# Patient Record
Sex: FEMALE | Race: WHITE | Employment: FULL TIME | URBAN - METROPOLITAN AREA
[De-identification: names, ages, dates, MRNs, and addresses within clinical notes are randomized per-mention and may not be internally consistent; named-entity substitution may affect disease eponyms.]

---

## 2017-03-23 ENCOUNTER — OP HISTORICAL/CONVERTED ENCOUNTER (OUTPATIENT)
Dept: OTHER | Age: 57
End: 2017-03-23

## 2017-04-03 ENCOUNTER — OP HISTORICAL/CONVERTED ENCOUNTER (OUTPATIENT)
Dept: OTHER | Age: 57
End: 2017-04-03

## 2017-04-12 ENCOUNTER — OP HISTORICAL/CONVERTED ENCOUNTER (OUTPATIENT)
Dept: OTHER | Age: 57
End: 2017-04-12

## 2018-01-11 ENCOUNTER — OP HISTORICAL/CONVERTED ENCOUNTER (OUTPATIENT)
Dept: OTHER | Age: 58
End: 2018-01-11

## 2018-04-02 ENCOUNTER — OP HISTORICAL/CONVERTED ENCOUNTER (OUTPATIENT)
Dept: OTHER | Age: 58
End: 2018-04-02

## 2018-06-24 ENCOUNTER — ED HISTORICAL/CONVERTED ENCOUNTER (OUTPATIENT)
Dept: OTHER | Age: 58
End: 2018-06-24

## 2019-03-26 ENCOUNTER — OP HISTORICAL/CONVERTED ENCOUNTER (OUTPATIENT)
Dept: OTHER | Age: 59
End: 2019-03-26

## 2019-03-29 ENCOUNTER — OP HISTORICAL/CONVERTED ENCOUNTER (OUTPATIENT)
Dept: OTHER | Age: 59
End: 2019-03-29

## 2019-12-26 ENCOUNTER — OP HISTORICAL/CONVERTED ENCOUNTER (OUTPATIENT)
Dept: OTHER | Age: 59
End: 2019-12-26

## 2020-04-18 ENCOUNTER — ED HISTORICAL/CONVERTED ENCOUNTER (OUTPATIENT)
Dept: OTHER | Age: 60
End: 2020-04-18

## 2020-08-03 RX ORDER — LEVOTHYROXINE SODIUM 150 UG/1
TABLET ORAL
Qty: 90 TAB | Refills: 1 | Status: SHIPPED | OUTPATIENT
Start: 2020-08-03 | End: 2021-03-30

## 2020-10-30 DIAGNOSIS — J41.1 MUCOPURULENT CHRONIC BRONCHITIS (HCC): Primary | ICD-10-CM

## 2020-10-30 RX ORDER — AZITHROMYCIN 250 MG/1
TABLET, FILM COATED ORAL
Qty: 6 TAB | Refills: 0 | Status: SHIPPED | OUTPATIENT
Start: 2020-10-30 | End: 2020-11-04

## 2020-10-30 RX ORDER — CODEINE PHOSPHATE AND GUAIFENESIN 10; 100 MG/5ML; MG/5ML
5 SOLUTION ORAL
Qty: 1 BOTTLE | Refills: 0 | Status: SHIPPED | OUTPATIENT
Start: 2020-10-30 | End: 2020-11-06 | Stop reason: ALTCHOICE

## 2020-11-02 ENCOUNTER — HOSPITAL ENCOUNTER (EMERGENCY)
Age: 60
Discharge: HOME | End: 2020-11-02
Attending: EMERGENCY MEDICINE
Payer: COMMERCIAL

## 2020-11-02 ENCOUNTER — APPOINTMENT (OUTPATIENT)
Dept: GENERAL RADIOLOGY | Age: 60
End: 2020-11-02
Attending: EMERGENCY MEDICINE
Payer: COMMERCIAL

## 2020-11-02 VITALS
BODY MASS INDEX: 37.49 KG/M2 | TEMPERATURE: 98.5 F | HEIGHT: 65 IN | RESPIRATION RATE: 18 BRPM | SYSTOLIC BLOOD PRESSURE: 139 MMHG | WEIGHT: 225 LBS | HEART RATE: 83 BPM | OXYGEN SATURATION: 96 % | DIASTOLIC BLOOD PRESSURE: 95 MMHG

## 2020-11-02 DIAGNOSIS — B97.89 VIRAL RESPIRATORY ILLNESS: Primary | ICD-10-CM

## 2020-11-02 DIAGNOSIS — J98.8 VIRAL RESPIRATORY ILLNESS: Primary | ICD-10-CM

## 2020-11-02 LAB
FLUAV AG NPH QL IA: NEGATIVE
FLUBV AG NOSE QL IA: NEGATIVE

## 2020-11-02 PROCEDURE — 87635 SARS-COV-2 COVID-19 AMP PRB: CPT

## 2020-11-02 PROCEDURE — 71045 X-RAY EXAM CHEST 1 VIEW: CPT

## 2020-11-02 PROCEDURE — 87804 INFLUENZA ASSAY W/OPTIC: CPT

## 2020-11-02 PROCEDURE — 99282 EMERGENCY DEPT VISIT SF MDM: CPT

## 2020-11-02 RX ORDER — PREDNISONE 50 MG/1
50 TABLET ORAL DAILY
Qty: 5 TAB | Refills: 0 | Status: SHIPPED | OUTPATIENT
Start: 2020-11-02 | End: 2020-11-07

## 2020-11-02 RX ORDER — BENZONATATE 100 MG/1
100 CAPSULE ORAL
Qty: 16 CAP | Refills: 0 | Status: SHIPPED | OUTPATIENT
Start: 2020-11-02 | End: 2020-11-06 | Stop reason: SDUPTHER

## 2020-11-02 NOTE — LETTER
[address] [address] [place] [address] 
[phone] Work/School Note Date: 11/2/2020 To Whom It May concern: 
 
Otto Abarca was evaulated by the following provider(s): 
Attending Provider: Sahara Mccall [address] [place] [address] virus is suspected. Per the CDC guidelines we recommend home isolation until the following conditions are all met: 1. At least 10 days have passed since symptoms first appeared and 2. At least 24 hours have passed since last fever without the use of fever-reducing medications and 
3. Symptoms (e.g., cough, shortness of breath) have improved Sincerely, 
Dr. Melo Falling

## 2020-11-02 NOTE — ED PROVIDER NOTES
HPI   Chief Complaint   Patient presents with    Shortness of Breath    Cough   78-year-old female with chronic asthma presents with shortness of breath and cough. Patient reports her symptoms started 10/28/2020. On the first day had fever 100.4 but no fever since then. Bilateral ear pain, sore throat initially, in the last several days started to have a non-productive cough, chest congestion and SOB. No chest pain or leg swelling. On Nyquil, Dayquil, azithromycin, breathing treatment. Not receiving sufficient relief. Works in a hospital.  Got flu shot a couple of weeks ago. Quit smoking 6 years ago. Past Medical History:   Diagnosis Date    Chronic asthma        History reviewed. No pertinent surgical history. History reviewed. No pertinent family history.     Social History     Socioeconomic History    Marital status:      Spouse name: Not on file    Number of children: Not on file    Years of education: Not on file    Highest education level: Not on file   Occupational History    Not on file   Social Needs    Financial resource strain: Not on file    Food insecurity     Worry: Not on file     Inability: Not on file    Transportation needs     Medical: Not on file     Non-medical: Not on file   Tobacco Use    Smoking status: Not on file   Substance and Sexual Activity    Alcohol use: Not on file    Drug use: Not on file    Sexual activity: Not on file   Lifestyle    Physical activity     Days per week: Not on file     Minutes per session: Not on file    Stress: Not on file   Relationships    Social connections     Talks on phone: Not on file     Gets together: Not on file     Attends Religion service: Not on file     Active member of club or organization: Not on file     Attends meetings of clubs or organizations: Not on file     Relationship status: Not on file    Intimate partner violence     Fear of current or ex partner: Not on file     Emotionally abused: Not on file Physically abused: Not on file     Forced sexual activity: Not on file   Other Topics Concern    Not on file   Social History Narrative    Not on file         ALLERGIES: Patient has no known allergies. Review of Systems   Constitutional: Positive for fever (Only on day 1 of disease course). HENT: Positive for congestion, ear pain and sore throat. Respiratory: Positive for cough, chest tightness and shortness of breath. All other systems reviewed and are negative. Vitals:    11/02/20 1310 11/02/20 1433   BP: (!) 139/95    Pulse: 83    Resp: 18    Temp:  98.5 °F (36.9 °C)   SpO2: 96%    Weight: 102.1 kg (225 lb)    Height: 5' 5\" (1.651 m)             Physical Exam   Nursing note and vitals reviewed. Constitutional: NAD. Head: Normocephalic and atraumatic. Mouth/Throat: Airway patent. Moist mucous membranes. Clear oropharynx. Ears: Bilateral serous effusion. No erythematous tympanic membrane bilaterally  Eyes: EOMI. No scleral icterus. Neck: Neck supple. Bilateral small cervical lymphadenopathy. Cardiovascular: Normal rate, regular rhythm. Normal heart sounds. No murmur, rub, or gallop. Good pulses throughout. Pulmonary/Chest: No respiratory distress. Actively making wet sounding cough, however no actual phlegm being produced. Bilateral wheezing, fairly good air entry throughout. Abdominal/GI: BS normal, Soft, non-tender, non-distended. No rebound or guarding. Musculoskeletal: No gross injuries or deformities. No leg edema. Neurological: Alert and oriented to person, place, and time. Cranial Nerves 2-12 intact. Moving all extremities. No gross deficits. Psych: Pleasant, cooperative. Skin: Skin is warm and dry. No rash noted. MDM   Ddx = flu, COVID-19, other viral URI, bacterial pneumonia, asthma exacerbation, undiagnosed COPD. Flu negative. Chest x-ray is clear. Patient is swabbed for COVID-19 and given isolation instructions.   Instructed to continue Robitussin, her home nebulizer and finish azithromycin, Tessalon Perles and prednisone burst 5-day course. Discharged home with return precautions. Labs Reviewed   INFLUENZA A & B AG (RAPID TEST)   SARS-COV-2, PCR   SARS-COV-2     XR CHEST SNGL V   Final Result   Impression: No acute pulmonary process. Medications - No data to display  I, Steve Amezquita MD, am  the first and primary ED provider for this patient.           Procedures

## 2020-11-02 NOTE — DISCHARGE INSTRUCTIONS
Continue azithromycin, robitussin and breathing treatments. Use breathing treatment once every 6 hours as needed for breathing problem. Take prednisone burst for 5 days. Isolate yourself from others until you get covid-19 negative result. Return to ED for severe or worsening breathing problems, or dehydration.

## 2020-11-02 NOTE — ED TRIAGE NOTES
Pt c/o \"wet sounding cough\", occasionally accompanied by SOB x3 days. History of chronic asthma; states Neb and albuterol providing some relief at home.

## 2020-11-04 ENCOUNTER — TELEPHONE (OUTPATIENT)
Dept: INTERNAL MEDICINE CLINIC | Age: 60
End: 2020-11-04

## 2020-11-05 LAB — SARS-COV-2, COV2NT: NOT DETECTED

## 2020-11-06 ENCOUNTER — VIRTUAL VISIT (OUTPATIENT)
Dept: INTERNAL MEDICINE CLINIC | Age: 60
End: 2020-11-06
Payer: COMMERCIAL

## 2020-11-06 ENCOUNTER — TELEPHONE (OUTPATIENT)
Dept: INTERNAL MEDICINE CLINIC | Age: 60
End: 2020-11-06

## 2020-11-06 DIAGNOSIS — J20.9 ACUTE BRONCHITIS, UNSPECIFIED ORGANISM: Primary | ICD-10-CM

## 2020-11-06 PROCEDURE — 99213 OFFICE O/P EST LOW 20 MIN: CPT | Performed by: INTERNAL MEDICINE

## 2020-11-06 RX ORDER — CODEINE PHOSPHATE AND GUAIFENESIN 10; 100 MG/5ML; MG/5ML
5 SOLUTION ORAL
Qty: 1 BOTTLE | Refills: 0 | Status: SHIPPED | OUTPATIENT
Start: 2020-11-06 | End: 2020-11-06

## 2020-11-06 RX ORDER — CITALOPRAM 20 MG/1
1 TABLET, FILM COATED ORAL DAILY
COMMUNITY
Start: 2004-12-01 | End: 2021-01-05

## 2020-11-06 RX ORDER — CODEINE PHOSPHATE AND GUAIFENESIN 10; 100 MG/5ML; MG/5ML
5 SOLUTION ORAL
Qty: 60 ML | Refills: 0 | Status: SHIPPED | OUTPATIENT
Start: 2020-11-06 | End: 2020-11-09 | Stop reason: SDUPTHER

## 2020-11-06 RX ORDER — FLUTICASONE PROPIONATE 110 UG/1
2 AEROSOL, METERED RESPIRATORY (INHALATION) EVERY 12 HOURS
Qty: 1 INHALER | Refills: 0 | Status: SHIPPED | OUTPATIENT
Start: 2020-11-06 | End: 2020-12-06

## 2020-11-06 RX ORDER — OMEPRAZOLE 40 MG/1
1 CAPSULE, DELAYED RELEASE ORAL DAILY
COMMUNITY
Start: 2020-10-03

## 2020-11-06 RX ORDER — BENZONATATE 100 MG/1
100 CAPSULE ORAL
Qty: 20 CAP | Refills: 1 | Status: SHIPPED | OUTPATIENT
Start: 2020-11-06 | End: 2020-11-13

## 2020-11-06 NOTE — PATIENT INSTRUCTIONS
Bronchitis: Care Instructions Your Care Instructions Bronchitis is inflammation of the bronchial tubes, which carry air to the lungs. The tubes swell and produce mucus, or phlegm. The mucus and inflamed bronchial tubes make you cough. You may have trouble breathing. Most cases of bronchitis are caused by viruses like those that cause colds. Antibiotics usually do not help and they may be harmful. Bronchitis usually develops rapidly and lasts about 2 to 3 weeks in otherwise healthy people. Follow-up care is a key part of your treatment and safety. Be sure to make and go to all appointments, and call your doctor if you are having problems. It's also a good idea to know your test results and keep a list of the medicines you take. How can you care for yourself at home? · Take all medicines exactly as prescribed. Call your doctor if you think you are having a problem with your medicine. · Get some extra rest. 
· Take an over-the-counter pain medicine, such as acetaminophen (Tylenol), ibuprofen (Advil, Motrin), or naproxen (Aleve) to reduce fever and relieve body aches. Read and follow all instructions on the label. · Do not take two or more pain medicines at the same time unless the doctor told you to. Many pain medicines have acetaminophen, which is Tylenol. Too much acetaminophen (Tylenol) can be harmful. · Take an over-the-counter cough medicine that contains dextromethorphan to help quiet a dry, hacking cough so that you can sleep. Avoid cough medicines that have more than one active ingredient. Read and follow all instructions on the label. · Breathe moist air from a humidifier, hot shower, or sink filled with hot water. The heat and moisture will thin mucus so you can cough it out. · Do not smoke. Smoking can make bronchitis worse. If you need help quitting, talk to your doctor about stop-smoking programs and medicines. These can increase your chances of quitting for good. When should you call for help? Call 911 anytime you think you may need emergency care. For example, call if: 
  · You have severe trouble breathing. Call your doctor now or seek immediate medical care if: 
  · You have new or worse trouble breathing.  
  · You cough up dark brown or bloody mucus (sputum).  
  · You have a new or higher fever.  
  · You have a new rash. Watch closely for changes in your health, and be sure to contact your doctor if: 
  · You cough more deeply or more often, especially if you notice more mucus or a change in the color of your mucus.  
  · You are not getting better as expected. Where can you learn more? Go to http://www.gray.com/ Enter H333 in the search box to learn more about \"Bronchitis: Care Instructions. \" Current as of: February 24, 2020               Content Version: 12.6 © 8879-6690 [employer]. Care instructions adapted under license by [employer] (which disclaims liability or warranty for this information). If you have questions about a medical condition or this instruction, always ask your healthcare professional. [address] any warranty or liability for your use of this information.

## 2020-11-06 NOTE — TELEPHONE ENCOUNTER
Patient called stated pharmacy said they could not fill order until they have the qty e.g. 60m or 120m

## 2020-11-06 NOTE — PROGRESS NOTES
Susanne Hinojosa is a 63 y.o. female and presents with Follow-up (went to the [address] [place] [address])    THIS VISIT WAS COMPLETED VIRTUALLY VIA DOXY. ME WITH PATIENTS CONSENT IN THE SETTING OF CORONAVIRUS PANDEMIC     Reviewed ER note from 2020, she warrants for her symptoms less than a week before consistent on low-grade fever 100.4 sudha, bilateral ear pain, sore throat and then progressed to a nonproductive cough, congestion, shortness of breath and wheezing. She feels a little better, but, She says she's still having congestion, coughing, she's not having sputum, but she feels congested, no more fever. She has taken azithromycin and cough syrup with codeine, she has been doing nebulizations TID, she has been taking tessalon, she finished steroids for 5 days. She's only using albuterol. In her chart there's h/o asthma, but, she says she has not had any symptoms since 3 years ago when she saw Dr. Ramesh Nguyễn. She usually does not have wheezing, sob or cough. She says her work asked her to complete [facility] paperwork since she has already used all of her allotted sick time. Review of Systems  Review of Systems   Constitutional: Positive for appetite change and fatigue. Negative for chills and fever. HENT: Positive for congestion, rhinorrhea and voice change. Respiratory: Positive for cough, shortness of breath and wheezing. Cardiovascular: Negative for chest pain and palpitations.           Past Medical History:   Diagnosis Date    Chronic asthma      Past Surgical History:   Procedure Laterality Date    HX APPENDECTOMY      HX  SECTION      times 2    HX HEENT      Throat surgery     Social History     Socioeconomic History    Marital status:      Spouse name: Not on file    Number of children: Not on file    Years of education: Not on file    Highest education level: Not on file   Tobacco Use    Smoking status: Former Smoker     Packs/day: 1.00     Years: 49.00     Pack years: 49.00     Last attempt to quit: 2014     Years since quittin.0    Smokeless tobacco: Never Used   Substance and Sexual Activity    Drug use: Never     Family History   Problem Relation Age of Onset    Diabetes Mother     Lung Disease Mother     Depression Mother     Hypertension Father     Psychiatric Disorder Father     Cancer Father     Alcohol abuse Father      Current Outpatient Medications   Medication Sig Dispense Refill    citalopram (CeleXA) 20 mg tablet Take 1 Tab by mouth daily.  guaiFENesin-codeine (Guaiatussin AC) 100-10 mg/5 mL solution Take 5 mL by mouth three (3) times daily as needed for Cough for up to 7 days. Max Daily Amount: 15 mL. 1 Bottle 0    fluticasone propionate (FLOVENT HFA) 110 mcg/actuation inhaler Take 2 Puffs by inhalation every twelve (12) hours for 30 days. 1 Inhaler 0    benzonatate (Tessalon Perles) 100 mg capsule Take 1 Cap by mouth three (3) times daily as needed for Cough for up to 7 days. 20 Cap 1    omeprazole (PRILOSEC) 40 mg capsule Take 1 Cap by mouth daily.  predniSONE (DELTASONE) 50 mg tablet Take 1 Tab by mouth daily for 5 days. 5 Tab 0    levothyroxine (SYNTHROID) 150 mcg tablet take 1 tablet by mouth once daily 90 Tab 1     No Known Allergies    Objective: There were no vitals taken for this visit. Physical Exam:   Physical Exam  Vitals signs and nursing note reviewed. Constitutional:       Appearance: Normal appearance. HENT:      Head: Normocephalic and atraumatic. Eyes:      General: No scleral icterus. Conjunctiva/sclera: Conjunctivae normal.      Pupils: Pupils are equal, round, and reactive to light. Neck:      Musculoskeletal: Normal range of motion. Skin:     Coloration: Skin is not jaundiced or pale. Findings: No rash. Neurological:      Mental Status: She is alert and oriented to person, place, and time.    Psychiatric:         Mood and Affect: Mood normal.         Behavior: Behavior normal. Thought Content: Thought content normal.         Judgment: Judgment normal.          Results for orders placed or performed during the hospital encounter of 11/02/20   INFLUENZA A & B AG (RAPID TEST)   Result Value Ref Range    Influenza A Antigen Negative Negative      Influenza B Antigen Negative Negative     SARS-COV-2   Result Value Ref Range    SARS-CoV-2 Nasopharyngeal     NOVEL CORONAVIRUS (COVID-19)   Result Value Ref Range    SARS-CoV-2 Not Detected        Assessment/Plan:    Her symptoms suggestive of acute viral bronchitis, I educated her about natural history of this, she should start feeling better within a week or so, but cough might linger for several weeks. Since she mentions she has been having frequent wheezing and she is coughing significantly during this visit, will start ICS. Can continue Tessalon as cough suppressant and sending more cough syrup with codeine for a few days. Gave her general recommendations of care. Reviewed hospital results, Covid test negative, inform her of results since she was unaware. ICD-10-CM ICD-9-CM    1. Acute bronchitis, unspecified organism  J20.9 466.0 guaiFENesin-codeine (Guaiatussin AC) 100-10 mg/5 mL solution      fluticasone propionate (FLOVENT HFA) 110 mcg/actuation inhaler      benzonatate (Tessalon Perles) 100 mg capsule     Orders Placed This Encounter    citalopram (CeleXA) 20 mg tablet     Sig: Take 1 Tab by mouth daily.  omeprazole (PRILOSEC) 40 mg capsule     Sig: Take 1 Cap by mouth daily.  guaiFENesin-codeine (Guaiatussin AC) 100-10 mg/5 mL solution     Sig: Take 5 mL by mouth three (3) times daily as needed for Cough for up to 7 days. Max Daily Amount: 15 mL. Dispense:  1 Bottle     Refill:  0    fluticasone propionate (FLOVENT HFA) 110 mcg/actuation inhaler     Sig: Take 2 Puffs by inhalation every twelve (12) hours for 30 days.      Dispense:  1 Inhaler     Refill:  0    benzonatate (Tessalon Perles) 100 mg capsule     Sig: Take 1 Cap by mouth three (3) times daily as needed for Cough for up to 7 days. Dispense:  20 Cap     Refill:  1       Patient Instructions          Bronchitis: Care Instructions  Your Care Instructions     Bronchitis is inflammation of the bronchial tubes, which carry air to the lungs. The tubes swell and produce mucus, or phlegm. The mucus and inflamed bronchial tubes make you cough. You may have trouble breathing. Most cases of bronchitis are caused by viruses like those that cause colds. Antibiotics usually do not help and they may be harmful. Bronchitis usually develops rapidly and lasts about 2 to 3 weeks in otherwise healthy people. Follow-up care is a key part of your treatment and safety. Be sure to make and go to all appointments, and call your doctor if you are having problems. It's also a good idea to know your test results and keep a list of the medicines you take. How can you care for yourself at home? · Take all medicines exactly as prescribed. Call your doctor if you think you are having a problem with your medicine. · Get some extra rest.  · Take an over-the-counter pain medicine, such as acetaminophen (Tylenol), ibuprofen (Advil, Motrin), or naproxen (Aleve) to reduce fever and relieve body aches. Read and follow all instructions on the label. · Do not take two or more pain medicines at the same time unless the doctor told you to. Many pain medicines have acetaminophen, which is Tylenol. Too much acetaminophen (Tylenol) can be harmful. · Take an over-the-counter cough medicine that contains dextromethorphan to help quiet a dry, hacking cough so that you can sleep. Avoid cough medicines that have more than one active ingredient. Read and follow all instructions on the label. · Breathe moist air from a humidifier, hot shower, or sink filled with hot water. The heat and moisture will thin mucus so you can cough it out. · Do not smoke. Smoking can make bronchitis worse.  If you need help quitting, talk to your doctor about stop-smoking programs and medicines. These can increase your chances of quitting for good. When should you call for help? Call 911 anytime you think you may need emergency care. For example, call if:    · You have severe trouble breathing. Call your doctor now or seek immediate medical care if:    · You have new or worse trouble breathing.     · You cough up dark brown or bloody mucus (sputum).     · You have a new or higher fever.     · You have a new rash. Watch closely for changes in your health, and be sure to contact your doctor if:    · You cough more deeply or more often, especially if you notice more mucus or a change in the color of your mucus.     · You are not getting better as expected. Where can you learn more? Go to http://www.gray.com/  Enter H333 in the search box to learn more about \"Bronchitis: Care Instructions. \"  Current as of: February 24, 2020               Content Version: 12.6  © 2006-2020 [employer]. Care instructions adapted under license by [employer] (which disclaims liability or warranty for this information). If you have questions about a medical condition or this instruction, always ask your healthcare professional. [address] [address] any warranty or liability for your use of this information. Follow-up and Dispositions    · Return in about 1 month (around 12/6/2020).

## 2020-11-09 ENCOUNTER — TELEPHONE (OUTPATIENT)
Dept: INTERNAL MEDICINE CLINIC | Age: 60
End: 2020-11-09

## 2020-11-09 DIAGNOSIS — R05.9 COUGH: Primary | ICD-10-CM

## 2020-11-09 DIAGNOSIS — J20.9 ACUTE BRONCHITIS, UNSPECIFIED ORGANISM: ICD-10-CM

## 2020-11-09 RX ORDER — CODEINE PHOSPHATE AND GUAIFENESIN 10; 100 MG/5ML; MG/5ML
5 SOLUTION ORAL
Qty: 60 ML | Refills: 0 | Status: SHIPPED | OUTPATIENT
Start: 2020-11-09 | End: 2020-11-16

## 2020-11-09 NOTE — TELEPHONE ENCOUNTER
Patient called asking for a refill on the cough medication that Dr. Florinda Del Cid had ordered for her.

## 2020-11-17 ENCOUNTER — OFFICE VISIT (OUTPATIENT)
Dept: INTERNAL MEDICINE CLINIC | Age: 60
End: 2020-11-17
Payer: COMMERCIAL

## 2020-11-17 VITALS
RESPIRATION RATE: 18 BRPM | HEART RATE: 88 BPM | WEIGHT: 217 LBS | BODY MASS INDEX: 36.15 KG/M2 | SYSTOLIC BLOOD PRESSURE: 132 MMHG | OXYGEN SATURATION: 98 % | TEMPERATURE: 98.4 F | DIASTOLIC BLOOD PRESSURE: 88 MMHG | HEIGHT: 65 IN

## 2020-11-17 DIAGNOSIS — F43.21 SITUATIONAL DEPRESSION: ICD-10-CM

## 2020-11-17 DIAGNOSIS — J40 BRONCHITIS: Primary | ICD-10-CM

## 2020-11-17 PROCEDURE — 99214 OFFICE O/P EST MOD 30 MIN: CPT | Performed by: INTERNAL MEDICINE

## 2020-11-17 RX ORDER — ALBUTEROL SULFATE 90 UG/1
AEROSOL, METERED RESPIRATORY (INHALATION)
COMMUNITY
End: 2022-02-22 | Stop reason: SDUPTHER

## 2020-11-17 RX ORDER — CEFADROXIL 500 MG/1
500 CAPSULE ORAL 2 TIMES DAILY
Qty: 14 CAP | Refills: 0 | Status: SHIPPED | OUTPATIENT
Start: 2020-11-17 | End: 2020-11-24

## 2020-11-17 RX ORDER — DEXAMETHASONE 4 MG/1
TABLET ORAL
Qty: 14 TAB | Refills: 0 | Status: SHIPPED | OUTPATIENT
Start: 2020-11-17

## 2020-11-17 RX ORDER — IPRATROPIUM BROMIDE AND ALBUTEROL SULFATE 2.5; .5 MG/3ML; MG/3ML
3 SOLUTION RESPIRATORY (INHALATION)
Qty: 30 NEBULE | Refills: 0 | Status: SHIPPED | OUTPATIENT
Start: 2020-11-17 | End: 2020-12-02

## 2020-11-17 NOTE — PROGRESS NOTES
Kash Gordon is a 63 y.o. female and presents with Cough and Nasal Congestion  Patient presents for follow-up became ill about October 28 upper respiratory symptoms did present to the emergency [address] [facility] she was negative for influenza and coronavirus subsequent she was placed at the time on azithromycin she did follow-up with Dr. Andrea Major and was prescribed cough medication or Tessalon. She still has expiratory wheezes some cough slight sore throat irritated ears TMs were clear but some erythema on the right where she has most of her symptoms she did have wheezing chest x-ray recently revealed no evidence of infiltrate she is on albuterol inhalers and nebulizers at home 3 times a day she was agreeable for changing the medications and trying ipratropium bromide-Proventil nebulizers 4 times a day short course of cefadroxil and dexamethasone and hydrocodone cough syrup she needed the [facility] short-term disability filled out since she is missed several days of work           Review of Systems  Constitutional: negative for fevers, chills, anorexia, weight loss and fatigue,no insomnia  Eyes:   negative for visual disturbance and irritation, eye discharge, eye pain. no eye redness. ENT:   negative for tinnitus, sore throat, nasal congestion, ear pain, hoarseness, hearing loss.,no snoring. Respiratory:  Noted for cough, hemoptysis, shortness of breath, wheezing,  CV:   negative for chest pain, palpitations, lower extremity edema, shortness of breath while sitting, walking or at night  GI:   negative for nausea, vomiting, diarrhea, abdominal pain,melena,constipation. Endo:               negative for polyuria, polydipsia, polyphagia, cold or heat intolerance,hair loss. Genitourinary: negative for frequency, dysuria and hematuria,urethral discharge,nocturia.straining while urination,urinary incontinence.   Integument:  negative for rash and pruritus  Hematologic:  negative for easy bruising and gum/nose bleeding, enlarged nodes  Musculoskel: negative for myalgias, arthralgias, back pain, muscle weakness, joint pain, h/o fall,cramps,calf pain. Neurological:  negative for headaches, dizziness, vertigo, memory problems, gait and seizures loss of consciousness,no ataxia. Behavl/Psych: negative for feelings of anxiety, situational depression, mood changes ,sadness    Past Medical History:   Diagnosis Date    Chronic asthma      Past Surgical History:   Procedure Laterality Date    HX APPENDECTOMY      HX  SECTION      times 2    HX HEENT      Throat surgery     Social History     Socioeconomic History    Marital status:      Spouse name: Not on file    Number of children: Not on file    Years of education: Not on file    Highest education level: Not on file   Tobacco Use    Smoking status: Former Smoker     Packs/day: 1.00     Years: 49.00     Pack years: 49.00     Last attempt to quit: 2014     Years since quittin.0    Smokeless tobacco: Never Used   Substance and Sexual Activity    Drug use: Never     Family History   Problem Relation Age of Onset    Diabetes Mother     Lung Disease Mother     Depression Mother     Hypertension Father     Psychiatric Disorder Father     Cancer Father     Alcohol abuse Father      Current Outpatient Medications   Medication Sig Dispense Refill    albuterol (PROVENTIL HFA, VENTOLIN HFA, PROAIR HFA) 90 mcg/actuation inhaler Take  by inhalation.  hydrocodone-guaifenesin 2.5-200 mg/5 mL soln Take 5 mL by mouth two (2) times a day for 7 days. Max Daily Amount: 10 mL. 100 mL 0    cefadroxil (DURICEF) 500 mg capsule Take 1 Cap by mouth two (2) times a day for 7 days. 14 Cap 0    dexAMETHasone (DECADRON) 4 mg tablet 1 bid 14 Tab 0    albuterol-ipratropium (DUO-NEB) 2.5 mg-0.5 mg/3 ml nebu 3 mL by Nebulization route every six (6) hours as needed for Wheezing for up to 15 days.  30 Nebule 0    citalopram (CeleXA) 20 mg tablet Take 1 Tab by mouth daily.  omeprazole (PRILOSEC) 40 mg capsule Take 1 Cap by mouth daily.  fluticasone propionate (FLOVENT HFA) 110 mcg/actuation inhaler Take 2 Puffs by inhalation every twelve (12) hours for 30 days. 1 Inhaler 0    levothyroxine (SYNTHROID) 150 mcg tablet take 1 tablet by mouth once daily 90 Tab 1     No Known Allergies    Objective:  Visit Vitals  /88 (BP 1 Location: Left arm, BP Patient Position: Sitting)   Pulse 88   Temp 98.4 °F (36.9 °C) (Oral)   Resp 18   Ht 5' 5\" (1.651 m)   Wt 217 lb (98.4 kg)   SpO2 98%   BMI 36.11 kg/m²       Physical Exam:   Constitutional: General Appearance: healthy-appearing and obese. Level of Distress: NAD. Ambulation: ambulating normally. Psychiatric: Mental Status: normal mood and affect and active and alert. Orientation: to time, place, and person. no agitation. ,normal eye contact. normal insight  Head: Head: normocephalic and atraumatic. Eyes: Pupils: PERRLA. Sclerae: non-icteric. ENMT: No lesions on external ear, no hearing loss. No lesions on external nose, sinus tenderness, or nasal discharge. Lips, Teeth, and no mouth or lip ulcers   Neck: Neck: supple, trachea midline, and no masses. Lymph Nodes: no cervical LAD. Thyroid: no enlargement or nodules and non-tender. Lungs: Respiratory effort: no dyspnea. Auscultation: no wheezing, rales/crackles, or rhonchi and breath sounds normal and good air movement. Diminished breath sounds-rhonchi noted cough cardiovascular: Apical Impulse: not displaced. Heart Auscultation: normal S1 and S2; no murmurs, rubs, or gallops; and RRR. Neck vessels: no carotid bruits. Pulses including femoral / pedal: normal throughout. Abdomen: Bowel Sounds: normal. Inspection and Palpation: no tenderness, guarding, or masses and soft and non-distended. Liver: non-tender and no hepatomegaly. Spleen: non-tender and no splenomegaly. Musculoskeletal[de-identified] Extremities: no edema or varicosities.  Calf tenderness. Neurologic: Gait and Station: normal gait and station. Motor Strength normal right and left. Sensory and cerebellar intact. Skin: Inspection and palpation: no rash, lesions, or ulcer. Results for orders placed or performed during the hospital encounter of 20   INFLUENZA A & B AG (RAPID TEST)   Result Value Ref Range    Influenza A Antigen Negative Negative      Influenza B Antigen Negative Negative     SARS-COV-2   Result Value Ref Range    SARS-CoV-2 Nasopharyngeal     NOVEL CORONAVIRUS (COVID-19)   Result Value Ref Range    SARS-CoV-2 Not Detected        Assessment/Plan:    ICD-10-CM ICD-9-CM    1. Bronchitis  J40 490 hydrocodone-guaifenesin 2.5-200 mg/5 mL soln   2. Situational depression  F43.21 309.0      Orders Placed This Encounter    albuterol (PROVENTIL HFA, VENTOLIN HFA, PROAIR HFA) 90 mcg/actuation inhaler     Sig: Take  by inhalation.  hydrocodone-guaifenesin 2.5-200 mg/5 mL soln     Sig: Take 5 mL by mouth two (2) times a day for 7 days. Max Daily Amount: 10 mL. Dispense:  100 mL     Refill:  0    cefadroxil (DURICEF) 500 mg capsule     Sig: Take 1 Cap by mouth two (2) times a day for 7 days. Dispense:  14 Cap     Refill:  0    dexAMETHasone (DECADRON) 4 mg tablet     Si bid     Dispense:  14 Tab     Refill:  0    albuterol-ipratropium (DUO-NEB) 2.5 mg-0.5 mg/3 ml nebu     Sig: 3 mL by Nebulization route every six (6) hours as needed for Wheezing for up to 15 days. Dispense:  30 Nebule     Refill:  0       call if any problems    There are no Patient Instructions on file for this visit. Follow-up and Dispositions    · Return in about 1 week (around 2020).

## 2020-11-23 ENCOUNTER — OFFICE VISIT (OUTPATIENT)
Dept: INTERNAL MEDICINE CLINIC | Age: 60
End: 2020-11-23
Payer: COMMERCIAL

## 2020-11-23 VITALS
DIASTOLIC BLOOD PRESSURE: 68 MMHG | BODY MASS INDEX: 36.18 KG/M2 | TEMPERATURE: 98.3 F | WEIGHT: 217.4 LBS | SYSTOLIC BLOOD PRESSURE: 142 MMHG | OXYGEN SATURATION: 98 % | RESPIRATION RATE: 18 BRPM | HEART RATE: 74 BPM

## 2020-11-23 DIAGNOSIS — R03.0 ELEVATED BLOOD PRESSURE READING: ICD-10-CM

## 2020-11-23 DIAGNOSIS — E03.9 HYPOTHYROIDISM, UNSPECIFIED TYPE: ICD-10-CM

## 2020-11-23 DIAGNOSIS — J41.0 SIMPLE CHRONIC BRONCHITIS (HCC): Primary | ICD-10-CM

## 2020-11-23 DIAGNOSIS — R05.9 COUGH: ICD-10-CM

## 2020-11-23 DIAGNOSIS — J39.2: ICD-10-CM

## 2020-11-23 DIAGNOSIS — J32.9 OTHER SINUSITIS, UNSPECIFIED CHRONICITY: ICD-10-CM

## 2020-11-23 PROCEDURE — 99214 OFFICE O/P EST MOD 30 MIN: CPT | Performed by: INTERNAL MEDICINE

## 2020-11-23 NOTE — PROGRESS NOTES
Parish Vences is a 63 y.o. female and presents with Follow-up (Cough )  Patient presents for follow-up a bit better in regards to less cough and congestion although could not fill the hydrocodone cough syrup. She still has a slight cough sore from coughing especially in the rib cage lateral right mid to lower side pain with some palpation modestly. No expiratory wheezes at this time on duo nebs 4 times a day and she did finish the antibiotic and steroid she is concerned as her father had throat cancer although he drank alcohol and was a smoker. The patient is former smoker. She is somewhat hoarse and will refer to Dr. Kimberly Rojas otolaryngologist.  She feels her usual dry and irritated but I see nothing on exam of the external auditory canals oropharynx was clear with full upper plate no exudative lesions we will try codeine guaifenesin cough syrup see if that would help and consider CT scan of the chest neck and sinuses she has some postnasal drip sinus drainage blood at times from blowing her nose-lungs with generally clear no cervical adenopathy no shortness of breath with walking or talking no use of secondary muscles of respiration she wanted form filled out for temporary parking handicap due to her shortness of breath and cough due to her COPD-labs drawn by me for the studies below           Review of Systems  Constitutional: negative for fevers, chills, anorexia, weight loss and fatigue,no insomnia-overweight  Eyes:   negative for visual disturbance and irritation, eye discharge, eye pain. no eye redness. ENT:   negative for tinnitus, sore throat, nasal congestion, ear pain, hoarseness, hearing loss.,no snoring.   Sore throat  Respiratory:  Noted for cough, hemoptysis, occasional-shortness of breath, occasional wheezing,  CV:   negative for chest pain, palpitations, lower extremity edema, shortness of breath while sitting, walking or at night  GI:   negative for nausea, vomiting, diarrhea, abdominal pain,melena,constipation. Endo:               negative for polyuria, polydipsia, polyphagia, cold or heat intolerance,hair loss. Genitourinary: negative for frequency, dysuria and hematuria,urethral discharge,nocturia.straining while urination,urinary incontinence. Integument:  negative for rash and pruritus  Hematologic:  negative for easy bruising and gum/nose bleeding, enlarged nodes  Musculoskel: negative for myalgias, arthralgias, back pain, muscle weakness, joint pain, h/o fall,cramps,calf pain. Neurological:  negative for headaches, dizziness, vertigo, memory problems, gait and seizures loss of consciousness,no ataxia. Behavl/Psych: negative for feelings of anxiety, depression, mood changes ,sadness    Past Medical History:   Diagnosis Date    Chronic asthma      Past Surgical History:   Procedure Laterality Date    HX APPENDECTOMY      HX  SECTION      times 2    HX HEENT      Throat surgery     Social History     Socioeconomic History    Marital status:      Spouse name: Not on file    Number of children: Not on file    Years of education: Not on file    Highest education level: Not on file   Tobacco Use    Smoking status: Former Smoker     Packs/day: 1.00     Years: 49.00     Pack years: 49.00     Last attempt to quit: 2014     Years since quittin.0    Smokeless tobacco: Never Used   Substance and Sexual Activity    Drug use: Never     Family History   Problem Relation Age of Onset    Diabetes Mother     Lung Disease Mother     Depression Mother     Hypertension Father     Psychiatric Disorder Father     Cancer Father     Alcohol abuse Father      Current Outpatient Medications   Medication Sig Dispense Refill    codeine-guaifenesin  mg/5 mL liqd Take 5 mL by mouth four (4) times daily as needed (as needed) for up to 7 days.  Max Daily Amount: 20 mL. 120 mL 0    albuterol (PROVENTIL HFA, VENTOLIN HFA, PROAIR HFA) 90 mcg/actuation inhaler Take  by inhalation.  hydrocodone-guaifenesin 2.5-200 mg/5 mL soln Take 5 mL by mouth two (2) times a day for 7 days. Max Daily Amount: 10 mL. 100 mL 0    cefadroxil (DURICEF) 500 mg capsule Take 1 Cap by mouth two (2) times a day for 7 days. 14 Cap 0    dexAMETHasone (DECADRON) 4 mg tablet 1 bid 14 Tab 0    albuterol-ipratropium (DUO-NEB) 2.5 mg-0.5 mg/3 ml nebu 3 mL by Nebulization route every six (6) hours as needed for Wheezing for up to 15 days. 30 Nebule 0    citalopram (CeleXA) 20 mg tablet Take 1 Tab by mouth daily.  omeprazole (PRILOSEC) 40 mg capsule Take 1 Cap by mouth daily.  fluticasone propionate (FLOVENT HFA) 110 mcg/actuation inhaler Take 2 Puffs by inhalation every twelve (12) hours for 30 days. 1 Inhaler 0    levothyroxine (SYNTHROID) 150 mcg tablet take 1 tablet by mouth once daily 90 Tab 1     No Known Allergies    Objective:  Visit Vitals  BP (!) 142/68 (BP 1 Location: Right arm, BP Patient Position: Sitting)   Pulse 74   Temp 98.3 °F (36.8 °C) (Oral)   Resp 18   Wt 217 lb 6.4 oz (98.6 kg)   SpO2 98%   BMI 36.18 kg/m²       Physical Exam:   Constitutional: General Appearance: healthy-appearing and obese. Level of Distress: NAD. Ambulation: ambulating normally. Overweight  Psychiatric: Mental Status: normal mood and affect and active and alert. Orientation: to time, place, and person. no agitation. ,normal eye contact. normal insight  Head: Head: normocephalic and atraumatic. Eyes: Pupils: PERRLA. Sclerae: non-icteric. ENMT: No lesions on external ear, no hearing loss. No lesions on external nose, sinus tenderness, or nasal discharge. Lips, Teeth, and no mouth or lip ulcers   Neck: Neck: supple, trachea midline, and no masses. Lymph Nodes: no cervical LAD. Thyroid: no enlargement or nodules and non-tender. Lungs: Respiratory effort: no dyspnea. Auscultation: Intermittent wheezing, rales/crackles, or rhonchi and breath sounds normal and good air movement.    Cardiovascular: Apical Impulse: not displaced. Heart Auscultation: normal S1 and S2; no murmurs, rubs, or gallops; and RRR. Neck vessels: no carotid bruits. Pulses including femoral / pedal: normal throughout. Abdomen: Bowel Sounds: normal. Inspection and Palpation: no tenderness, guarding, or masses and soft and non-distended. Liver: non-tender and no hepatomegaly. Spleen: non-tender and no splenomegaly  Musculoskeletal[de-identified] Extremities: no edema or varicosities. Calf tenderness. Neurologic: Gait and Station: normal gait and station. Motor Strength normal right and left. Sensory and cerebellar intact. Skin: Inspection and palpation: no rash, lesions, or ulcer. Results for orders placed or performed during the hospital encounter of 11/02/20   INFLUENZA A & B AG (RAPID TEST)   Result Value Ref Range    Influenza A Antigen Negative Negative      Influenza B Antigen Negative Negative     SARS-COV-2   Result Value Ref Range    SARS-CoV-2 Nasopharyngeal     NOVEL CORONAVIRUS (COVID-19)   Result Value Ref Range    SARS-CoV-2 Not Detected        Assessment/Plan:    ICD-10-CM ICD-9-CM    1. Simple chronic bronchitis (HCC)  J41.0 491.0 CBC WITH AUTOMATED DIFF      codeine-guaifenesin  mg/5 mL liqd      DISCONTINUED: codeine-guaifenesin  mg/5 mL liqd      DISCONTINUED: codeine-guaifenesin  mg/5 mL liqd   2. Cough  R05 786.2 CT CHEST WO CONT   3. Hypothyroidism, unspecified type  E03.9 244.9 TSH 3RD GENERATION      T4, FREE   4. Elevated blood pressure reading  Q69.1 346.9 METABOLIC PANEL, COMPREHENSIVE   5. Other sinusitis, unspecified chronicity  J32.9 473.8 CT SINUSES WO CONT   6.  Pain in the pharynx  J39.2 478.29 CT NECK SOFT TISSUE WO CONT      REFERRAL TO ENT-OTOLARYNGOLOGY     Orders Placed This Encounter    CT SINUSES WO CONT     Standing Status:   Future     Standing Expiration Date:   12/23/2021    CT NECK SOFT TISSUE WO CONT     Standing Status:   Future     Standing Expiration Date:   12/23/2021    CT CHEST WO CONT     Standing Status:   Future     Standing Expiration Date:   12/23/2021    TSH 3RD GENERATION    T4, FREE    CBC WITH AUTOMATED DIFF    METABOLIC PANEL, COMPREHENSIVE    [facility]     Referral Priority:   Routine     Referral Type:   Consultation     Referral Reason:   Specialty Services Required     Referred to Provider:   Mike Alvarez MD     Number of Visits Requested:   1    DISCONTD: codeine-guaifenesin  mg/5 mL liqd     Sig: Take 5 mL by mouth four (4) times daily as needed (as needed for cough) for up to 7 days. Max Daily Amount: 20 mL. Dispense:  120 mL     Refill:  0    DISCONTD: codeine-guaifenesin  mg/5 mL liqd     Sig: Take 5 mL by mouth four (4) times daily as needed (as needed for cough) for up to 7 days. Max Daily Amount: 20 mL. Dispense:  120 mL     Refill:  0    codeine-guaifenesin  mg/5 mL liqd     Sig: Take 5 mL by mouth four (4) times daily as needed (as needed) for up to 7 days. Max Daily Amount: 20 mL. Dispense:  120 mL     Refill:  0       lose weight, increase physical activity, call if any problems    There are no Patient Instructions on file for this visit. Follow-up and Dispositions    · Return in about 3 weeks (around 12/14/2020).

## 2020-11-24 LAB
ALBUMIN SERPL-MCNC: 3.8 G/DL (ref 3.8–4.8)
ALBUMIN/GLOB SERPL: 1.5 {RATIO} (ref 1.2–2.2)
ALP SERPL-CCNC: 81 IU/L (ref 39–117)
ALT SERPL-CCNC: 18 IU/L (ref 0–32)
AST SERPL-CCNC: 12 IU/L (ref 0–40)
BASOPHILS # BLD AUTO: 0.1 X10E3/UL (ref 0–0.2)
BASOPHILS NFR BLD AUTO: 1 %
BILIRUB SERPL-MCNC: 0.2 MG/DL (ref 0–1.2)
BUN SERPL-MCNC: 23 MG/DL (ref 8–27)
BUN/CREAT SERPL: 22 (ref 12–28)
CALCIUM SERPL-MCNC: 9 MG/DL (ref 8.7–10.3)
CHLORIDE SERPL-SCNC: 101 MMOL/L (ref 96–106)
CO2 SERPL-SCNC: 24 MMOL/L (ref 20–29)
CREAT SERPL-MCNC: 1.04 MG/DL (ref 0.57–1)
EOSINOPHIL # BLD AUTO: 0.1 X10E3/UL (ref 0–0.4)
EOSINOPHIL NFR BLD AUTO: 0 %
ERYTHROCYTE [DISTWIDTH] IN BLOOD BY AUTOMATED COUNT: 13.1 % (ref 11.7–15.4)
GLOBULIN SER CALC-MCNC: 2.6 G/DL (ref 1.5–4.5)
GLUCOSE SERPL-MCNC: 86 MG/DL (ref 65–99)
HCT VFR BLD AUTO: 46.9 % (ref 34–46.6)
HGB BLD-MCNC: 16.3 G/DL (ref 11.1–15.9)
IMM GRANULOCYTES # BLD AUTO: 0.7 X10E3/UL (ref 0–0.1)
IMM GRANULOCYTES NFR BLD AUTO: 3 %
LYMPHOCYTES # BLD AUTO: 4.3 X10E3/UL (ref 0.7–3.1)
LYMPHOCYTES NFR BLD AUTO: 19 %
MCH RBC QN AUTO: 33.6 PG (ref 26.6–33)
MCHC RBC AUTO-ENTMCNC: 34.8 G/DL (ref 31.5–35.7)
MCV RBC AUTO: 97 FL (ref 79–97)
MONOCYTES # BLD AUTO: 1.3 X10E3/UL (ref 0.1–0.9)
MONOCYTES NFR BLD AUTO: 6 %
NEUTROPHILS # BLD AUTO: 15.8 X10E3/UL (ref 1.4–7)
NEUTROPHILS NFR BLD AUTO: 71 %
PLATELET # BLD AUTO: 295 X10E3/UL (ref 150–450)
POTASSIUM SERPL-SCNC: 3.9 MMOL/L (ref 3.5–5.2)
PROT SERPL-MCNC: 6.4 G/DL (ref 6–8.5)
RBC # BLD AUTO: 4.85 X10E6/UL (ref 3.77–5.28)
SODIUM SERPL-SCNC: 138 MMOL/L (ref 134–144)
T4 FREE SERPL-MCNC: 0.93 NG/DL (ref 0.82–1.77)
TSH SERPL DL<=0.005 MIU/L-ACNC: 17.1 UIU/ML (ref 0.45–4.5)
WBC # BLD AUTO: 22.4 X10E3/UL (ref 3.4–10.8)

## 2020-11-25 ENCOUNTER — TELEPHONE (OUTPATIENT)
Dept: INTERNAL MEDICINE CLINIC | Age: 60
End: 2020-11-25

## 2020-11-25 NOTE — TELEPHONE ENCOUNTER
Patient called stating that [employer] called her and said that they have not received any of the paperwork and the extension to follow her through January. If they do not receive it within the next 3 to 5 days she can lose her job. Wants to know if they have been completed and faxed.     Also patient has an appointment with the hospital on Dec. 2 for a CT scan and then she has an appointment on 1/11 with Dr. Joshua Jean.

## 2020-12-07 ENCOUNTER — TELEPHONE (OUTPATIENT)
Dept: INTERNAL MEDICINE CLINIC | Age: 60
End: 2020-12-07

## 2020-12-07 NOTE — TELEPHONE ENCOUNTER
Insurance denied the request for CT of sinuses for this patient. Let me know if you would like to do a peer to peer.

## 2020-12-14 ENCOUNTER — OFFICE VISIT (OUTPATIENT)
Dept: INTERNAL MEDICINE CLINIC | Age: 60
End: 2020-12-14
Payer: COMMERCIAL

## 2020-12-14 VITALS
RESPIRATION RATE: 18 BRPM | OXYGEN SATURATION: 98 % | WEIGHT: 217.4 LBS | SYSTOLIC BLOOD PRESSURE: 142 MMHG | DIASTOLIC BLOOD PRESSURE: 86 MMHG | BODY MASS INDEX: 36.22 KG/M2 | TEMPERATURE: 98.3 F | HEIGHT: 65 IN | HEART RATE: 81 BPM

## 2020-12-14 DIAGNOSIS — E66.3 OVERWEIGHT: ICD-10-CM

## 2020-12-14 DIAGNOSIS — E03.9 ACQUIRED HYPOTHYROIDISM: ICD-10-CM

## 2020-12-14 DIAGNOSIS — R43.0 LOSS, SENSE OF, SMELL: ICD-10-CM

## 2020-12-14 DIAGNOSIS — R49.0 HOARSE: ICD-10-CM

## 2020-12-14 DIAGNOSIS — J40 BRONCHITIS: Primary | ICD-10-CM

## 2020-12-14 DIAGNOSIS — I10 ESSENTIAL HYPERTENSION: ICD-10-CM

## 2020-12-14 PROBLEM — J42 CHRONIC BRONCHITIS WITH WHEEZING (HCC): Status: ACTIVE | Noted: 2020-12-14

## 2020-12-14 PROCEDURE — 99214 OFFICE O/P EST MOD 30 MIN: CPT | Performed by: INTERNAL MEDICINE

## 2020-12-14 RX ORDER — UMECLIDINIUM BROMIDE AND VILANTEROL TRIFENATATE 62.5; 25 UG/1; UG/1
1 POWDER RESPIRATORY (INHALATION) DAILY
Qty: 1 INHALER | Refills: 0 | Status: SHIPPED | OUTPATIENT
Start: 2020-12-14 | End: 2021-01-13

## 2020-12-14 RX ORDER — PREDNISONE 10 MG/1
10 TABLET ORAL
Qty: 30 TAB | Refills: 0 | Status: SHIPPED | OUTPATIENT
Start: 2020-12-14 | End: 2021-01-13

## 2020-12-14 NOTE — PROGRESS NOTES
Elena Solis is a 63 y.o. female and presents with Follow-up  Patient presents feeling a bit better inflammation in the ear is resolved still has cough but much less few rhonchi still so she was concerned because of history of family members with malignancy in the past patient wishes to try to see if we could approve CT imaging again. She has been seen by Dr. Beverly Ramirez and is not satisfied and does not wish to go back. She is awaiting appointment with Dr. Sophy James- ENT January for further evaluation of chronic sinus hoarseness. She was advised to discontinue Flonase and just use albuterol metered-dose inhaler 4 times daily as needed rescue inhaler and will start her on Anoro once a day prednisone 10 mg once a day over the next 3 to 4 weeks. She still seem to be a bit better we discussed last labs showing a white count of 22,000 she may have been taking the steroids at that time TSH high at 17-but she is back on thyroid replacement hemoglobin 16.3 potassium 3.9 creatinine 1.04 glucose 86 calcium 9.0 SGOT of 12 performed November 23 this year. She remains with some symptoms referable to eustachian tube dysfunction will see if prednisone helps-patient notes some loss of smell or taste to some degree but this may be related to the postnasal drip allergy symptoms. She says she has not been exposed to anybody that she knows of with coronavirus she has chronic congestion cough for the past month or so           Review of Systems  Constitutional: negative for fevers, chills, anorexia, weight loss and fatigue,no insomniaoverwight  Eyes:   negative for visual disturbance and irritation, eye discharge, eye pain. no eye redness. ENT:   negative for tinnitus, sore throat, nasal congestion, ear pain, hoarseness, hearing loss.,no snoring.   Respiratory:  Noted  for cough, hemoptysis, shortness of breath, occasionalwheezing,  CV:   negative for chest pain, palpitations, lower extremity edema, shortness of breath while sitting, walking or at night  GI:   negative for nausea, vomiting, diarrhea, abdominal pain,melena,constipation. Endo:               negative for polyuria, polydipsia, polyphagia, cold or heat intolerance,hair loss. Genitourinary: negative for frequency, dysuria and hematuria,urethral discharge,nocturia.straining while urination,urinary incontinence. Integument:  negative for rash and pruritus  Hematologic:  negative for easy bruising and gum/nose bleeding, enlarged nodes  Musculoskel: negative for myalgias, arthralgias, back pain, muscle weakness, joint pain, h/o fall,cramps,calf pain. djd  Neurological:  negative for headaches, dizziness, vertigo, memory problems, gait and seizures loss of consciousness,no ataxia. Behavl/Psych: negative for feelings of anxiety, depression, mood changes ,sadness    Past Medical History:   Diagnosis Date    Chronic asthma      Past Surgical History:   Procedure Laterality Date    HX APPENDECTOMY      HX  SECTION      times 2    HX HEENT      Throat surgery     Social History     Socioeconomic History    Marital status:      Spouse name: Not on file    Number of children: Not on file    Years of education: Not on file    Highest education level: Not on file   Tobacco Use    Smoking status: Former Smoker     Packs/day: 1.00     Years: 49.00     Pack years: 49.00     Last attempt to quit: 2014     Years since quittin.1    Smokeless tobacco: Never Used   Substance and Sexual Activity    Drug use: Never     Family History   Problem Relation Age of Onset    Diabetes Mother     Lung Disease Mother     Depression Mother     Hypertension Father     Psychiatric Disorder Father     Cancer Father     Alcohol abuse Father      Current Outpatient Medications   Medication Sig Dispense Refill    predniSONE (DELTASONE) 10 mg tablet Take 10 mg by mouth daily (with breakfast) for 30 days.  30 Tab 0    umeclidinium-vilanteroL (Anoro Ellipta) 62.5-25 mcg/actuation inhaler Take 1 Puff by inhalation daily for 30 days. 1 Inhaler 0    albuterol (PROVENTIL HFA, VENTOLIN HFA, PROAIR HFA) 90 mcg/actuation inhaler Take  by inhalation.  dexAMETHasone (DECADRON) 4 mg tablet 1 bid 14 Tab 0    citalopram (CeleXA) 20 mg tablet Take 1 Tab by mouth daily.  omeprazole (PRILOSEC) 40 mg capsule Take 1 Cap by mouth daily.  levothyroxine (SYNTHROID) 150 mcg tablet take 1 tablet by mouth once daily 90 Tab 1     No Known Allergies    Objective:  Visit Vitals  BP (!) 142/86 (BP 1 Location: Left arm, BP Patient Position: Sitting)   Pulse 81   Temp 98.3 °F (36.8 °C) (Oral)   Resp 18   Ht 5' 5\" (1.651 m)   Wt 217 lb 6.4 oz (98.6 kg)   SpO2 98%   BMI 36.18 kg/m²       Physical Exam:   Constitutional: General Appearance: healthy-appearing and obese. Level of Distress: NAD. Ambulation: ambulating normally. overweight  Psychiatric: Mental Status: normal mood and affect and active and alert. Orientation: to time, place, and person. no agitation. ,normal eye contact. normal insight  Head: Head: normocephalic and atraumatic. Eyes: Pupils: PERRLA. Sclerae: non-icteric. ENMT: No lesions on external ear, no hearing loss. No lesions on external nose, sinus tenderness, or nasal discharge. Lips, Teeth, and no mouth or lip ulcers   Neck: Neck: supple, trachea midline, and no masses. Lymph Nodes: no cervical LAD. Thyroid: no enlargement or nodules and non-tender. Lungs: Respiratory effort: no dyspnea. Auscultation: no wheezing, rales/crackles, or rhonchi and breath sounds normal and good air movement. noted expiratory wheeze  Cardiovascular: Apical Impulse: not displaced. Heart Auscultation: normal S1 and S2; no murmurs, rubs, or gallops; and RRR. Neck vessels: no carotid bruits. Pulses including femoral / pedal: normal throughout. Abdomen: Bowel Sounds: normal. Inspection and Palpation: no tenderness, guarding, or masses and soft and non-distended. Liver: non-tender and no hepatomegaly. Spleen: non-tender and no splenomegaly. Musculoskeletal[de-identified] Extremities: no edema or varicosities. Calf tenderness. djd  Neurologic: Gait and Station: normal gait and station. Motor Strength normal right and left. Sensory and cerebellar intact. Skin: Inspection and palpation: no rash, lesions, or ulcer. Results for orders placed or performed in visit on 11/23/20   TSH 3RD GENERATION   Result Value Ref Range    TSH 17.100 (H) 0.450 - 4.500 uIU/mL   T4, FREE   Result Value Ref Range    T4, Free 0.93 0.82 - 1.77 ng/dL   CBC WITH AUTOMATED DIFF   Result Value Ref Range    WBC 22.4 (HH) 3.4 - 10.8 x10E3/uL    RBC 4.85 3.77 - 5.28 x10E6/uL    HGB 16.3 (H) 11.1 - 15.9 g/dL    HCT 46.9 (H) 34.0 - 46.6 %    MCV 97 79 - 97 fL    MCH 33.6 (H) 26.6 - 33.0 pg    MCHC 34.8 31.5 - 35.7 g/dL    RDW 13.1 11.7 - 15.4 %    PLATELET 759 567 - 901 x10E3/uL    NEUTROPHILS 71 Not Estab. %    Lymphocytes 19 Not Estab. %    MONOCYTES 6 Not Estab. %    EOSINOPHILS 0 Not Estab. %    BASOPHILS 1 Not Estab. %    ABS. NEUTROPHILS 15.8 (H) 1.4 - 7.0 x10E3/uL    Abs Lymphocytes 4.3 (H) 0.7 - 3.1 x10E3/uL    ABS. MONOCYTES 1.3 (H) 0.1 - 0.9 x10E3/uL    ABS. EOSINOPHILS 0.1 0.0 - 0.4 x10E3/uL    ABS. BASOPHILS 0.1 0.0 - 0.2 x10E3/uL    IMMATURE GRANULOCYTES 3 Not Estab. %    ABS. IMM. GRANS. 0.7 (H) 0.0 - 0.1 G24D5/HG   METABOLIC PANEL, COMPREHENSIVE   Result Value Ref Range    Glucose 86 65 - 99 mg/dL    BUN 23 8 - 27 mg/dL    Creatinine 1.04 (H) 0.57 - 1.00 mg/dL    GFR est non-AA 58 (L) >59 mL/min/1.73    GFR est AA 67 >59 mL/min/1.73    BUN/Creatinine ratio 22 12 - 28    Sodium 138 134 - 144 mmol/L    Potassium 3.9 3.5 - 5.2 mmol/L    Chloride 101 96 - 106 mmol/L    CO2 24 20 - 29 mmol/L    Calcium 9.0 8.7 - 10.3 mg/dL    Protein, total 6.4 6.0 - 8.5 g/dL    Albumin 3.8 3.8 - 4.8 g/dL    GLOBULIN, TOTAL 2.6 1.5 - 4.5 g/dL    A-G Ratio 1.5 1.2 - 2.2    Bilirubin, total 0.2 0.0 - 1.2 mg/dL    Alk.  phosphatase 81 39 - 117 IU/L    AST (SGOT) 12 0 - 40 IU/L    ALT (SGPT) 18 0 - 32 IU/L       Assessment/Plan:    ICD-10-CM ICD-9-CM    1. Bronchitis  J40 490    2. Acquired hypothyroidism  E03.9 244.9    3. Hoarse  R49.0 784.42 CT CHEST WO CONT      CT NECK SOFT TISSUE WO CONT   4. Overweight  E66.3 278.02    5. Loss, sense of, smell  R43.0 781.1 CT SINUSES WO CONT   6. Essential hypertension  I10 401.9      Orders Placed This Encounter    CT CHEST WO CONT     Standing Status:   Future     Standing Expiration Date:   1/14/2022    CT NECK SOFT TISSUE WO CONT     Standing Status:   Future     Standing Expiration Date:   1/14/2022    CT SINUSES WO CONT     Standing Status:   Future     Standing Expiration Date:   1/14/2022     Order Specific Question:   Reason for Exam     Answer:   loss smell    predniSONE (DELTASONE) 10 mg tablet     Sig: Take 10 mg by mouth daily (with breakfast) for 30 days. Dispense:  30 Tab     Refill:  0    umeclidinium-vilanteroL (Anoro Ellipta) 62.5-25 mcg/actuation inhaler     Sig: Take 1 Puff by inhalation daily for 30 days. Dispense:  1 Inhaler     Refill:  0       call if any problems    There are no Patient Instructions on file for this visit. Follow-up and Dispositions    · Return if symptoms worsen or fail to improve.

## 2021-01-11 ENCOUNTER — OFFICE VISIT (OUTPATIENT)
Dept: ENT CLINIC | Age: 61
End: 2021-01-11
Payer: COMMERCIAL

## 2021-01-11 VITALS
HEART RATE: 80 BPM | RESPIRATION RATE: 18 BRPM | TEMPERATURE: 97.1 F | BODY MASS INDEX: 36.15 KG/M2 | OXYGEN SATURATION: 96 % | HEIGHT: 65 IN | DIASTOLIC BLOOD PRESSURE: 90 MMHG | SYSTOLIC BLOOD PRESSURE: 150 MMHG | WEIGHT: 217 LBS

## 2021-01-11 DIAGNOSIS — H92.03 OTALGIA OF BOTH EARS: ICD-10-CM

## 2021-01-11 DIAGNOSIS — J31.0 CHRONIC RHINITIS: Primary | ICD-10-CM

## 2021-01-11 DIAGNOSIS — F17.200 SMOKER: ICD-10-CM

## 2021-01-11 DIAGNOSIS — R43.0 ANOSMIA: ICD-10-CM

## 2021-01-11 DIAGNOSIS — J34.2 DNS (DEVIATED NASAL SEPTUM): ICD-10-CM

## 2021-01-11 DIAGNOSIS — R49.0 DYSPHONIA: ICD-10-CM

## 2021-01-11 PROCEDURE — 99204 OFFICE O/P NEW MOD 45 MIN: CPT | Performed by: OTOLARYNGOLOGY

## 2021-01-11 PROCEDURE — 31575 DIAGNOSTIC LARYNGOSCOPY: CPT | Performed by: OTOLARYNGOLOGY

## 2021-01-11 NOTE — LETTER
1/11/2021    Patient: Evelio Francisco   YOB: 1960   Date of Visit: 1/11/2021     Meliza Carrington MD  [address] [place] [address]  [address] [employer]    Dear Meliza Carrington MD,      Thank you for referring Ms. Catherine Gu to [facility]ERGY [facility] for evaluation. My notes for this consultation are attached. If you have questions, please do not hesitate to call me. I look forward to following your patient along with you.       Sincerely,    Cindy Braun MD

## 2021-01-11 NOTE — PROGRESS NOTES
Subjective:    Libertad Cannon   63 y.o.   1960     HPI     Location - ear, throat    Quality - hoarse, ear pain    Severity -  moderate    Duration -  3  months    Timing - ongoing    Context - pt started with chest congestion, ear pain, sore throat, was given zpack; she also had anosmia; was COVD negative but high suspicion; still c/o hoarseness and sore throat; prior hx laryngeal polyp removed in ; some ear pain also; occ laryngeal spasms    Modifying Features - nothing seems to help    Associated symptoms/signs - fatigue, orthopnea, wet cough      Review of Systems  Review of Systems   Constitutional: Positive for malaise/fatigue. Negative for chills and fever. HENT: Positive for congestion, ear pain and sore throat. Negative for hearing loss, nosebleeds and tinnitus. Eyes: Negative for blurred vision and double vision. Respiratory: Positive for cough and shortness of breath. Negative for sputum production. Cardiovascular: Positive for orthopnea. Negative for chest pain and palpitations. Gastrointestinal: Negative for heartburn, nausea and vomiting. Musculoskeletal: Negative for joint pain and neck pain. Skin: Negative. Neurological: Negative for dizziness, speech change, weakness and headaches. Endo/Heme/Allergies: Negative for environmental allergies. Does not bruise/bleed easily. Psychiatric/Behavioral: Negative for memory loss. The patient does not have insomnia.           Past Medical History:   Diagnosis Date    Chronic asthma     COPD [facility]chronic obstructive pulmonary disease) ([facility] [address]     Thyroid disease      Past Surgical History:   Procedure Laterality Date    HX APPENDECTOMY      HX  SECTION      times 2    HX HEENT      Throat surgery      Family History   Problem Relation Age of Onset    Diabetes Mother     Lung Disease Mother     Depression Mother     Hypertension Father     Psychiatric Disorder Father     Cancer Father     Alcohol abuse Father Social History     Tobacco Use    Smoking status: Former Smoker     Packs/day: 1.00     Years: 49.00     Pack years: 49.00     Quit date: 2014     Years since quittin.1    Smokeless tobacco: Never Used   Substance Use Topics    Alcohol use: Not on file      Prior to Admission medications    Medication Sig Start Date End Date Taking? Authorizing Provider   citalopram (CELEXA) 20 mg tablet Take 1 Tab by mouth daily for 180 days. 21  Orien Aase, MD   predniSONE (DELTASONE) 10 mg tablet Take 10 mg by mouth daily (with breakfast) for 30 days. 20  Orien Aase, MD   umeclidinium-vilanteroL (Anoro Ellipta) 62.5-25 mcg/actuation inhaler Take 1 Puff by inhalation daily for 30 days. 20  Orien Aase, MD   albuterol (PROVENTIL HFA, VENTOLIN HFA, PROAIR HFA) 90 mcg/actuation inhaler Take  by inhalation. Provider, Historical   dexAMETHasone (DECADRON) 4 mg tablet 1 bid 20   Orien Aase, MD   omeprazole (PRILOSEC) 40 mg capsule Take 1 Cap by mouth daily. 10/3/20   Provider, Historical   levothyroxine (SYNTHROID) 150 mcg tablet take 1 tablet by mouth once daily 8/3/20   Orien Aase, MD        No Known Allergies      Objective:     Visit Vitals  BP (!) 150/90 (BP 1 Location: Left arm, BP Patient Position: Sitting)   Pulse 80   Temp 97.1 °F (36.2 °C) (Oral)   Resp 18   Ht 5' 5\" (1.651 m)   Wt 217 lb (98.4 kg)   SpO2 96%   BMI 36.11 kg/m²        Physical Exam  Vitals signs reviewed. Constitutional:       General: She is awake. She is not in acute distress. Appearance: Normal appearance. She is well-groomed. She is obese. HENT:      Head: Normocephalic and atraumatic. Jaw: There is normal jaw occlusion. No trismus, tenderness or malocclusion. Salivary Glands: Right salivary gland is diffusely enlarged. Right salivary gland is not tender. Left salivary gland is diffusely enlarged. Left salivary gland is not tender.       Right Ear: Hearing, tympanic membrane, ear canal and external ear normal.      Left Ear: Hearing, tympanic membrane, ear canal and external ear normal.      Ears:      Lyles exam findings: does not lateralize. Right Rinne: AC > BC. Left Rinne: AC > BC. Nose: Septal deviation present. No nasal deformity or mucosal edema. Right Nostril: No epistaxis. Left Nostril: No epistaxis. Right Turbinates: Not enlarged, swollen or pale. Left Turbinates: Not enlarged, swollen or pale. Right Sinus: No maxillary sinus tenderness or frontal sinus tenderness. Left Sinus: No maxillary sinus tenderness or frontal sinus tenderness. Mouth/Throat:      Lips: Pink. No lesions. Mouth: Mucous membranes are moist. No oral lesions. Dentition: Normal dentition. No dental caries. Tongue: No lesions. Palate: No mass and lesions. Pharynx: Oropharynx is clear. Uvula midline. Uvula swelling present. No oropharyngeal exudate or posterior oropharyngeal erythema. Tonsils: No tonsillar exudate. 0 on the right. 0 on the left. Comments: S/p tonsillectomy  Eyes:      General: Vision grossly intact. Extraocular Movements: Extraocular movements intact. Right eye: No nystagmus. Left eye: No nystagmus. Conjunctiva/sclera: Conjunctivae normal.      Pupils: Pupils are equal, round, and reactive to light. Neck:      Musculoskeletal: No edema or erythema. Thyroid: No thyroid mass, thyromegaly or thyroid tenderness. Trachea: Trachea normal. No tracheal tenderness or tracheal deviation. Comments: slt hoarse  Cardiovascular:      Rate and Rhythm: Normal rate and regular rhythm. Pulmonary:      Effort: Pulmonary effort is normal. No respiratory distress. Breath sounds: No stridor. Musculoskeletal: Normal range of motion. General: No swelling or tenderness. Lymphadenopathy:      Cervical: No cervical adenopathy.    Skin:     General: Skin is warm and dry.      Findings: No lesion or rash. Neurological:      General: No focal deficit present. Mental Status: She is alert and oriented to person, place, and time. Mental status is at baseline. Cranial Nerves: Cranial nerves are intact. Coordination: Romberg sign negative. Gait: Gait is intact. Comments: Negative Hallpike   Psychiatric:         Mood and Affect: Mood normal.         Behavior: Behavior normal. Behavior is cooperative. Procedure Note - Fiberoptic Laryngoscopy    The nostril is packed with lidocaine/oxymetazoline cottonball for several minutes for topical anesthesia. After several minutes the packing is removed and fiberoptic scope is advanced. Findings are as summarized. Nose - normal turbinates, DNS to left  Nasopharynx - normal eustachian tubes, no mucosal lesions  Oropharynx - normal tonsils, tongue base and posterior wall  Hypopharynx - normal pyriform sinus and post-cricoid region  Larynx - mild right cord keratosis posteriorly  Subglottis - visualized upper trachea is normal        Assessment/Plan:     Encounter Diagnoses   Name Primary?  Chronic rhinitis Yes    DNS (deviated nasal septum)     Otalgia of both ears     Smoker     Dysphonia     Anosmia      Rec CT sinus for her anosmia  Her neck is clear and scope is clear  Smoking cessation discussed  She likely had COVID    No orders of the defined types were placed in this encounter.

## 2021-01-19 ENCOUNTER — TELEPHONE (OUTPATIENT)
Dept: INTERNAL MEDICINE CLINIC | Age: 61
End: 2021-01-19

## 2021-01-19 ENCOUNTER — HOSPITAL ENCOUNTER (OUTPATIENT)
Dept: CT IMAGING | Age: 61
Discharge: HOME | End: 2021-01-19
Attending: OTOLARYNGOLOGY
Payer: COMMERCIAL

## 2021-01-19 DIAGNOSIS — J44.9 CHRONIC OBSTRUCTIVE PULMONARY DISEASE, UNSPECIFIED COPD TYPE (HCC): Primary | ICD-10-CM

## 2021-01-19 DIAGNOSIS — R43.0 ANOSMIA: ICD-10-CM

## 2021-01-19 PROCEDURE — 70486 CT MAXILLOFACIAL W/O DYE: CPT

## 2021-01-19 NOTE — TELEPHONE ENCOUNTER
Patient says that she is not able to do housework without getting SOB and having to sit down. She is making an appointment with Dr. Faisal Nobles.

## 2021-01-20 ENCOUNTER — HOSPITAL ENCOUNTER (OUTPATIENT)
Dept: GENERAL RADIOLOGY | Age: 61
Discharge: HOME | End: 2021-01-20
Payer: COMMERCIAL

## 2021-01-20 ENCOUNTER — TRANSCRIBE ORDER (OUTPATIENT)
Dept: REGISTRATION | Age: 61
End: 2021-01-20

## 2021-01-20 DIAGNOSIS — J44.9 COPD (CHRONIC OBSTRUCTIVE PULMONARY DISEASE) (HCC): Primary | ICD-10-CM

## 2021-01-20 DIAGNOSIS — J44.9 COPD (CHRONIC OBSTRUCTIVE PULMONARY DISEASE) (HCC): ICD-10-CM

## 2021-01-20 PROCEDURE — 71046 X-RAY EXAM CHEST 2 VIEWS: CPT

## 2021-03-11 ENCOUNTER — TELEPHONE (OUTPATIENT)
Dept: OBGYN CLINIC | Age: 61
End: 2021-03-11

## 2021-03-11 NOTE — TELEPHONE ENCOUNTER
Patient called requesting a refill on Percocet. Advised patient Dr Graham Sun is out of the office until 03/15/21. Message forwarded to Dr Graham Sun.

## 2021-03-16 DIAGNOSIS — G89.29 CHRONIC LOW BACK PAIN WITHOUT SCIATICA, UNSPECIFIED BACK PAIN LATERALITY: Primary | ICD-10-CM

## 2021-03-16 DIAGNOSIS — M54.50 CHRONIC LOW BACK PAIN WITHOUT SCIATICA, UNSPECIFIED BACK PAIN LATERALITY: Primary | ICD-10-CM

## 2021-03-16 RX ORDER — OXYCODONE AND ACETAMINOPHEN 5; 325 MG/1; MG/1
1 TABLET ORAL
Qty: 28 TAB | Refills: 0 | Status: SHIPPED | OUTPATIENT
Start: 2021-03-16 | End: 2021-03-23

## 2021-03-30 RX ORDER — LEVOTHYROXINE SODIUM 150 UG/1
TABLET ORAL
Qty: 90 TAB | Refills: 1 | Status: SHIPPED | OUTPATIENT
Start: 2021-03-30 | End: 2021-10-07

## 2021-04-01 ENCOUNTER — VIRTUAL VISIT (OUTPATIENT)
Dept: SLEEP MEDICINE | Age: 61
End: 2021-04-01
Payer: COMMERCIAL

## 2021-04-01 VITALS — HEIGHT: 65 IN | BODY MASS INDEX: 36.65 KG/M2 | WEIGHT: 220 LBS

## 2021-04-01 DIAGNOSIS — G47.33 OSA (OBSTRUCTIVE SLEEP APNEA): Primary | ICD-10-CM

## 2021-04-01 DIAGNOSIS — G47.19 EXCESSIVE DAYTIME SLEEPINESS: ICD-10-CM

## 2021-04-01 PROCEDURE — 99204 OFFICE O/P NEW MOD 45 MIN: CPT | Performed by: SPECIALIST

## 2021-04-01 NOTE — PROGRESS NOTES
[address] [facility] [place], [address]  Tel.  [phone]  Fax. [address] [facility] [address]  [place], [address] [facility]  Tel.  [phone]  Fax. [phone] [address] [facility] [address]   Tel.  [phone]  Fax. [phone]     Dickson Steiner is a 63 y.o. female who was seen by synchronous (real-time) audio-video technology on 4/1/2021. Consent:  She and/or her healthcare decision maker is aware that this patient-initiated Telehealth encounter is a billable service, with coverage as determined by her insurance carrier. She is aware that she may receive a bill and has provided verbal consent to proceed: Yes    I was in the office while conducting this encounter. Chief Complaint       Chief Complaint   Patient presents with    Sleep Problem     NP, ref by SOB,difficulty maintain sleep, insomnia, gasping/choking, snoring, eval for KIM, pt wants to do HST-NO PA required ref [id number]( HST will need to be mailed to pt)       HPI      Dickson Steiner is 63 y.o. female seen for evaluation of a sleep disorder. Patient has a history of snoring and daytime fatigue. She normally retires at 11 PM and will awaken at 4 AM.  She will awaken several times during the night. She has been told of prominent snoring, sleep talking, waking with a gasp or snort. She denies vivid dreaming or nightmares, abnormal arm or leg movements, hypnagogic hallucinations, sleep paralysis or cataplexy. She may easily doze if she is seated and inactive such as reading, watching TV, public place such as movies or seated after lunch. The patient has not undergone diagnostic testing for the current problems.      [place] Sleepiness Scale: 15            No Known Allergies    Current Outpatient Medications   Medication Sig Dispense Refill    levothyroxine (SYNTHROID) 150 mcg tablet take 1 tablet by mouth once daily 90 Tab 1    umeclidinium-vilanteroL (ANORO ELLIPTA) 62.5-25 mcg/actuation inhaler Take 1 Puff by inhalation daily for 90 days. 1 Inhaler 2    citalopram (CELEXA) 20 mg tablet Take 1 Tab by mouth daily for 180 days. 90 Tab 1    albuterol (PROVENTIL HFA, VENTOLIN HFA, PROAIR HFA) 90 mcg/actuation inhaler Take  by inhalation.  dexAMETHasone (DECADRON) 4 mg tablet 1 bid 14 Tab 0    omeprazole (PRILOSEC) 40 mg capsule Take 1 Cap by mouth daily. She  has a past medical history of Chronic asthma, COPD (chronic obstructive pulmonary disease) ([address] and Thyroid disease. She  has a past surgical history that includes hx heent; hx  section; and hx appendectomy. She family history includes Alcohol abuse in her father; Cancer in her father; Depression in her mother; Diabetes in her mother; Hypertension in her father; Lung Disease in her mother; Psychiatric Disorder in her father. She  reports that she quit smoking about 6 years ago. She has a 49.00 pack-year smoking history. She has never used smokeless tobacco. She reports that she does not use drugs. Review of Systems:  Review of Systems   Constitutional: Negative for chills and fever. HENT: Positive for hearing loss and tinnitus. Eyes: Negative for blurred vision and double vision. Respiratory: Positive for cough and shortness of breath. Cardiovascular: Negative for chest pain and palpitations. Gastrointestinal: Negative for heartburn and nausea. Genitourinary: Negative for frequency and urgency. Musculoskeletal: Positive for back pain. Skin: Positive for itching. Neurological: Negative for dizziness and headaches. Psychiatric/Behavioral: Negative for depression. The patient is not nervous/anxious. Objective:     Visit Vitals  Ht 5' 5\" (1.651 m)   Wt 220 lb (99.8 kg)   BMI 36.61 kg/m²     Body mass index is 36.61 kg/m².     General:   Conversant, cooperative   Eyes:   no nystagmus   Oropharynx:   Mallampati score III,  tongue large,                   Skin:   no obvious rashes   Neuro:  Speech fluent, face symmetrical, tongue movement normal   Psych:  Normal affect,  normal countenance        Assessment:       ICD-10-CM ICD-9-CM    1. KIM (obstructive sleep apnea)  G47.33 327.23 SLEEP STUDY UNATTENDED, 4 CHANNEL   2. Excessive daytime sleepiness  G47.19 780.54        History consistent with sleep disordered breathing. She will be evaluated with a home sleep test.  Results to be reviewed with her. Plan:     Orders Placed This Encounter    SLEEP STUDY UNATTENDED, 4 CHANNEL     Order Specific Question:   Reason for Exam     Answer:   snoring       * Patient has a history and examination consistent with the diagnosis of sleep apnea. *Home sleep testing was ordered for initial evaluation. * She was provided information on sleep apnea including corresponding risk factors and the importance of proper treatment. * Treatment options if indicated were reviewed today. Instructions:  o Do not engage in activities requiring a normal degree of alertness if fatigue is present. o The patient understands that untreated or undertreated sleep apnea could impair judgement and the ability to function normally during the day.  o Call or return if symptoms worsen or persist.          Morales Dan MD, FAASM  Electronically signed 04/01/21     Pursuant to the emergency declaration under the [address] [facility] [address] waiver authority and the [employer] and [employer] Act, this Virtual  Visit was conducted, with patient's consent, to reduce the patient's risk of exposure to COVID-19 and provide continuity of care for an established patient. Services were provided through a video synchronous discussion virtually to substitute for in-person clinic visit. Gregory Ayoub MD     This note was created using voice recognition software. Despite editing, there may be syntax errors.   This note will not be viewable in [address] Greater than 20 minutes spent: In direct video patient care, chart review and planning.

## 2021-04-05 ENCOUNTER — OFFICE VISIT (OUTPATIENT)
Dept: SLEEP MEDICINE | Age: 61
End: 2021-04-05

## 2021-04-05 DIAGNOSIS — G47.33 OSA (OBSTRUCTIVE SLEEP APNEA): Primary | ICD-10-CM

## 2021-04-05 NOTE — PROGRESS NOTES
[address] [place]. [place], [address]  Tel.  [phone]  Fax. [address] [facility] [address]  [place], [address] [facility]  Tel.  [phone]  Fax. [phone] [address] [address]  Tel.  [phone]  Fax. [phone]       S>Nickie Gonzalez is a 63 y.o. female seen today to receive a home sleep testing unit (HST). · Patient was educated on proper hookup and operation of the HST. · Instruction forms and documentation were reviewed and signed. · The patient demonstrated good understanding of the HST.    O>    There were no vitals taken for this visit. A>  No diagnosis found. P>  · General information regarding operations and maintenance of the device was provided. · She was provided information on sleep apnea including coresponding risk factors and the importance of proper treatment. · Follow-up appointment was made to return the HST. She will be contacted once the results have been reviewed. · She was asked to contact our office for any problems regarding her home sleep test study.   · HSAT SN # [id number]  · HSAT is being Delivered to patient half way do to issues with travel distance

## 2021-04-07 ENCOUNTER — HOSPITAL ENCOUNTER (OUTPATIENT)
Dept: SLEEP MEDICINE | Age: 61
Discharge: HOME | End: 2021-04-07
Payer: COMMERCIAL

## 2021-04-07 PROCEDURE — 95806 SLEEP STUDY UNATT&RESP EFFT: CPT | Performed by: SPECIALIST

## 2021-04-19 ENCOUNTER — TELEPHONE (OUTPATIENT)
Dept: SLEEP MEDICINE | Age: 61
End: 2021-04-19

## 2021-04-19 DIAGNOSIS — G47.33 OSA (OBSTRUCTIVE SLEEP APNEA): Primary | ICD-10-CM

## 2021-04-22 ENCOUNTER — DOCUMENTATION ONLY (OUTPATIENT)
Dept: SLEEP MEDICINE | Age: 61
End: 2021-04-22

## 2021-04-27 ENCOUNTER — TELEPHONE (OUTPATIENT)
Dept: SLEEP MEDICINE | Age: 61
End: 2021-04-27

## 2021-04-28 ENCOUNTER — TELEPHONE (OUTPATIENT)
Dept: SLEEP MEDICINE | Age: 61
End: 2021-04-28

## 2021-04-28 NOTE — TELEPHONE ENCOUNTER
Alexa Higuera from [address] called and stated that the pt's pulmonologist insisted that she get her pap machine and supplies through [address] order to DME

## 2021-04-29 ENCOUNTER — DOCUMENTATION ONLY (OUTPATIENT)
Dept: SLEEP MEDICINE | Age: 61
End: 2021-04-29

## 2021-04-29 NOTE — PROGRESS NOTES
Per patient/referring pulmonologist request, we will cancel order for CPAP sent to [facility],THE 4/28/21 and all subsequent follow-up.  Patient will proceed with CPAP setup, follow-up, etc with referring pulmonologist. All notes and study results faxed to referring pulmonologist.

## 2022-02-08 RX ORDER — LEVOTHYROXINE SODIUM 150 UG/1
TABLET ORAL
Qty: 30 TABLET | Refills: 0 | Status: SHIPPED | OUTPATIENT
Start: 2022-02-08

## 2022-02-15 ENCOUNTER — OFFICE VISIT (OUTPATIENT)
Dept: INTERNAL MEDICINE CLINIC | Age: 62
End: 2022-02-15
Payer: COMMERCIAL

## 2022-02-15 ENCOUNTER — TELEPHONE (OUTPATIENT)
Dept: INTERNAL MEDICINE CLINIC | Age: 62
End: 2022-02-15

## 2022-02-15 VITALS
SYSTOLIC BLOOD PRESSURE: 168 MMHG | TEMPERATURE: 98.3 F | OXYGEN SATURATION: 98 % | HEART RATE: 72 BPM | HEIGHT: 65 IN | RESPIRATION RATE: 20 BRPM | BODY MASS INDEX: 35.99 KG/M2 | DIASTOLIC BLOOD PRESSURE: 102 MMHG | WEIGHT: 216 LBS

## 2022-02-15 DIAGNOSIS — G47.33 OBSTRUCTIVE SLEEP APNEA: ICD-10-CM

## 2022-02-15 DIAGNOSIS — I10 ESSENTIAL HYPERTENSION: ICD-10-CM

## 2022-02-15 DIAGNOSIS — J44.9 CHRONIC OBSTRUCTIVE PULMONARY DISEASE, UNSPECIFIED COPD TYPE (HCC): ICD-10-CM

## 2022-02-15 DIAGNOSIS — E66.3 OVERWEIGHT: ICD-10-CM

## 2022-02-15 DIAGNOSIS — E03.9 ACQUIRED HYPOTHYROIDISM: Primary | ICD-10-CM

## 2022-02-15 PROCEDURE — 99214 OFFICE O/P EST MOD 30 MIN: CPT | Performed by: INTERNAL MEDICINE

## 2022-02-15 RX ORDER — LOSARTAN POTASSIUM AND HYDROCHLOROTHIAZIDE 12.5; 1 MG/1; MG/1
1 TABLET ORAL DAILY
Qty: 90 TABLET | Refills: 0 | Status: SHIPPED | OUTPATIENT
Start: 2022-02-15 | End: 2022-02-22 | Stop reason: SDUPTHER

## 2022-02-15 RX ORDER — ALBUTEROL SULFATE 90 UG/1
1 AEROSOL, METERED RESPIRATORY (INHALATION)
Qty: 18 G | Refills: 5 | Status: SHIPPED | OUTPATIENT
Start: 2022-02-15

## 2022-02-15 RX ORDER — LOSARTAN POTASSIUM AND HYDROCHLOROTHIAZIDE 12.5; 1 MG/1; MG/1
1 TABLET ORAL DAILY
Qty: 90 TABLET | Refills: 1 | Status: SHIPPED | OUTPATIENT
Start: 2022-02-15 | End: 2022-08-14

## 2022-02-15 RX ORDER — CITALOPRAM 20 MG/1
20 TABLET, FILM COATED ORAL DAILY
COMMUNITY

## 2022-02-15 NOTE — PROGRESS NOTES
Chester Logan is a 57 y.o. female and presents with Annual Wellness Visit  Patient presents for follow-up spent about 45 minutes with this individual discussing recent follow-up with specialist history of possible obstructive sleep apnea followed by Dr. Grimes local pulmonologist but he referred her to Dr. Jc Clemente to call for overnight polysomnography testing which demonstrated severe obstructive sleep apnea. Patient does have some daytime somnolence snoring and was advised on evaluation for CPAP mask he has set this up for her to obtain and on referral back to Dr. Usman Gomez or visit back to  patient said he was quite upset that he wanted to arrange the referral 3) medical locally patient did not know this was to be the case and said Dr. Usman Gomez was quite upset and did not proceed with ordering this mask patient says that she had problems with working as she had exertional shortness of breath but did have high suspicion a year ago for COVID with symptoms of worsening shortness of breath and anosmia Covid test was negative at that time symptoms referable to possible post Covid syndrome patient had to retire from work because she was not able to perform her function at work due to extreme fatigue shortness of breath she says Dr. Usman Gomez would not fill out the FMLA forms and said she was okay and did not follow-up.   I will contact Dr. Juan arreaga as the advised the patient if she had indeed severe obstructive sleep apnea this needs to be treated while she is watching weight losing weight dieting we discussed pulmonary hypertension dependent edema her history of essential hypertension as aggravated by pulmonary hypertension if present blood pressure today 160/98 left arm she was agreeable to start losartan HCTZ she also sees Dr. Sanjay Rivera  for gastroesophageal reflux disease to follow-up with him March 29 she is on Prilosec 40 mg a day which helps she is also seen by Dr. Gene Chi otolaryngologist CT imaging of the maxillary sinuses negative overall patient doing well I will start her on albuterol metered-dose inhalers and Christopher is also on levothyroxine for hypothyroidism-patient agreeable to the office phlebotomist well blood pressure stable           Review of Systems  Constitutional: negative for fevers, chills, anorexia, weight loss and fatigue,no insomnia overweight eyes:   negative for visual disturbance and irritation, eye discharge, eye pain. no eye redness. ENT:   negative for tinnitus, sore throat, nasal congestion, ear pain, hoarseness, hearing loss.,no snoring. Respiratory:  negative for cough, hemoptysis, occasional shortness of breath, wheezing,  CV:   negative for chest pain, palpitations, lower extremity edema, shortness of breath while sitting, walking or at night  GI:   negative for nausea, vomiting, diarrhea, abdominal pain,melena,constipation. Endo:               negative for polyuria, polydipsia, polyphagia, cold or heat intolerance,hair loss. Genitourinary: negative for frequency, dysuria and hematuria,urethral discharge,nocturia.straining while urination,urinary incontinence. Integument:  negative for rash and pruritus  Hematologic:  negative for easy bruising and gum/nose bleeding, enlarged nodes  Musculoskel: negative for myalgias, arthralgias, back pain, muscle weakness, joint pain, h/o fall,cramps,calf pain. Neurological:  negative for headaches, dizziness, vertigo, memory problems, gait and seizures loss of consciousness,no ataxia.   Behavl/Psych: negative for feelings of anxiety, depression, mood changes ,sadness    Past Medical History:   Diagnosis Date    Chronic asthma     COPD (chronic obstructive pulmonary disease) ([facility]     Thyroid disease      Past Surgical History:   Procedure Laterality Date    HX APPENDECTOMY      HX  SECTION      times 2    HX HEENT      Throat surgery     Social History     Socioeconomic History    Marital status:    Tobacco Use    Smoking status: Former Smoker     Packs/day: 1.00     Years: 49.00     Pack years: 49.00     Quit date: 2014     Years since quittin.2    Smokeless tobacco: Never Used   Vaping Use    Vaping Use: Never used   Substance and Sexual Activity    Drug use: Never     Family History   Problem Relation Age of Onset    Diabetes Mother     Lung Disease Mother     Depression Mother     Hypertension Father     Psychiatric Disorder Father     Cancer Father     Alcohol abuse Father      Current Outpatient Medications   Medication Sig Dispense Refill    citalopram (CeleXA) 20 mg tablet Take 20 mg by mouth daily.  losartan-hydroCHLOROthiazide (HYZAAR) 100-12.5 mg per tablet Take 1 Tablet by mouth daily for 90 days. 90 Tablet 0    losartan-hydroCHLOROthiazide (HYZAAR) 100-12.5 mg per tablet Take 1 Tablet by mouth daily for 180 days. 90 Tablet 1    albuterol (PROVENTIL HFA, VENTOLIN HFA, PROAIR HFA) 90 mcg/actuation inhaler Take 1 Puff by inhalation every six (6) hours as needed for Wheezing. 18 g 5    levothyroxine (SYNTHROID) 150 mcg tablet take 1 tablet by mouth once daily MUST MAKE APPOINTMENT FOR FURTHER REFILL 30 Tablet 0    albuterol (PROVENTIL HFA, VENTOLIN HFA, PROAIR HFA) 90 mcg/actuation inhaler Take  by inhalation.  omeprazole (PRILOSEC) 40 mg capsule Take 1 Cap by mouth daily.  hydrocortisone-pramoxine (EPIFOAM) 1-1 % topical foam Apply  to affected area three (3) times daily.  (Patient not taking: Reported on 2/15/2022) 1 Can 1    dexAMETHasone (DECADRON) 4 mg tablet 1 bid (Patient not taking: Reported on 2/15/2022) 14 Tab 0     No Known Allergies    Objective:  Visit Vitals  BP (!) 168/102 (BP 1 Location: Left upper arm, BP Patient Position: Sitting, BP Cuff Size: Adult)   Pulse 72   Temp 98.3 °F (36.8 °C) (Oral)   Resp 20   Ht 5' 5\" (1.651 m)   Wt 216 lb (98 kg)   SpO2 98%   BMI 35.94 kg/m²       Physical Exam:   Constitutional: General Appearance: healthy-appearing and obese. Level of Distress: NAD. Ambulation: ambulating normally. Overweight for height  Psychiatric: Mental Status: normal mood and affect and active and alert. Orientation: to time, place, and person. no agitation. ,normal eye contact. normal insight  Head: Head: normocephalic and atraumatic. Eyes: Pupils: PERRLA. Sclerae: non-icteric. ENMT: No lesions on external ear, no hearing loss. No lesions on external nose, sinus tenderness, or nasal discharge. Lips, Teeth, and no mouth or lip ulcers   Neck: Neck: supple, trachea midline, and no masses. Lymph Nodes: no cervical LAD. Thyroid: no enlargement or nodules and non-tender. Lungs: Respiratory effort: no dyspnea. Auscultation: no wheezing, rales/crackles, or rhonchi and breath sounds normal and good air movement. Cardiovascular: Apical Impulse: not displaced. Heart Auscultation: normal S1 and S2; no murmurs, rubs, or gallops; and RRR. Neck vessels: no carotid bruits. Pulses including femoral / pedal: normal throughout. Abdomen: Bowel Sounds: normal. Inspection and Palpation: no tenderness, guarding, or masses and soft and non-distended. Liver: non-tender and no hepatomegaly. Musculoskeletal[de-identified] Extremities: no edema or varicosities. Calf tenderness. DJD neurologic: Gait and Station: normal gait and station. Motor Strength normal right and left. Sensory and cerebellar intact. Skin: Inspection and palpation: no rash, lesions, or ulcer.        Results for orders placed or performed in visit on 11/23/20   TSH 3RD GENERATION   Result Value Ref Range    TSH 17.100 (H) 0.450 - 4.500 uIU/mL   T4, FREE   Result Value Ref Range    T4, Free 0.93 0.82 - 1.77 ng/dL   CBC WITH AUTOMATED DIFF   Result Value Ref Range    WBC 22.4 (HH) 3.4 - 10.8 x10E3/uL    RBC 4.85 3.77 - 5.28 x10E6/uL    HGB 16.3 (H) 11.1 - 15.9 g/dL    HCT 46.9 (H) 34.0 - 46.6 %    MCV 97 79 - 97 fL    MCH 33.6 (H) 26.6 - 33.0 pg    MCHC 34.8 31.5 - 35.7 g/dL    RDW 13.1 11.7 - 15.4 %    PLATELET 395 315 - 185 x10E3/uL    NEUTROPHILS 71 Not Estab. %    Lymphocytes 19 Not Estab. %    MONOCYTES 6 Not Estab. %    EOSINOPHILS 0 Not Estab. %    BASOPHILS 1 Not Estab. %    ABS. NEUTROPHILS 15.8 (H) 1.4 - 7.0 x10E3/uL    Abs Lymphocytes 4.3 (H) 0.7 - 3.1 x10E3/uL    ABS. MONOCYTES 1.3 (H) 0.1 - 0.9 x10E3/uL    ABS. EOSINOPHILS 0.1 0.0 - 0.4 x10E3/uL    ABS. BASOPHILS 0.1 0.0 - 0.2 x10E3/uL    IMMATURE GRANULOCYTES 3 Not Estab. %    ABS. IMM. GRANS. 0.7 (H) 0.0 - 0.1 [id number]/TJ   METABOLIC PANEL, COMPREHENSIVE   Result Value Ref Range    Glucose 86 65 - 99 mg/dL    BUN 23 8 - 27 mg/dL    Creatinine 1.04 (H) 0.57 - 1.00 mg/dL    GFR est non-AA 58 (L) >59 mL/min/1.73    GFR est AA 67 >59 mL/min/1.73    BUN/Creatinine ratio 22 12 - 28    Sodium 138 134 - 144 mmol/L    Potassium 3.9 3.5 - 5.2 mmol/L    Chloride 101 96 - 106 mmol/L    CO2 24 20 - 29 mmol/L    Calcium 9.0 8.7 - 10.3 mg/dL    Protein, total 6.4 6.0 - 8.5 g/dL    Albumin 3.8 3.8 - 4.8 g/dL    GLOBULIN, TOTAL 2.6 1.5 - 4.5 g/dL    A-G Ratio 1.5 1.2 - 2.2    Bilirubin, total 0.2 0.0 - 1.2 mg/dL    Alk. phosphatase 81 39 - 117 IU/L    AST (SGOT) 12 0 - 40 IU/L    ALT (SGPT) 18 0 - 32 IU/L       Assessment/Plan:    ICD-10-CM ICD-9-CM    1. Acquired hypothyroidism  E03.9 244.9 T4, FREE      TSH 3RD GENERATION   2. Essential hypertension  I10 401.9 CBC WITH AUTOMATED DIFF      METABOLIC PANEL, COMPREHENSIVE      LIPID PANEL   3. Obstructive sleep apnea  G47.33 327.23    4. Chronic obstructive pulmonary disease, unspecified [facility] type ([facility]  J44.9 496    5. Overweight  E66.3 278.02      Orders Placed This Encounter    CBC WITH AUTOMATED DIFF    METABOLIC PANEL, COMPREHENSIVE    LIPID PANEL    T4, FREE    TSH 3RD GENERATION    citalopram (CeleXA) 20 mg tablet     Sig: Take 20 mg by mouth daily.  losartan-hydroCHLOROthiazide (HYZAAR) 100-12.5 mg per tablet     Sig: Take 1 Tablet by mouth daily for 90 days.      Dispense:  90 Tablet     Refill:  0  losartan-hydroCHLOROthiazide (HYZAAR) 100-12.5 mg per tablet     Sig: Take 1 Tablet by mouth daily for 180 days. Dispense:  90 Tablet     Refill:  1    albuterol (PROVENTIL HFA, VENTOLIN HFA, PROAIR HFA) 90 mcg/actuation inhaler     Sig: Take 1 Puff by inhalation every six (6) hours as needed for Wheezing. Dispense:  18 g     Refill:  5       continue present plan, routine labs ordered, call if any problems    There are no Patient Instructions on file for this visit. Follow-up and Dispositions    · Return in about 3 months (around 5/15/2022).

## 2022-02-15 NOTE — PROGRESS NOTES
1. Have you been to the ER, urgent care clinic since your last visit? Hospitalized since your last visit? No    2. Have you seen or consulted any other health care providers outside of the [address] [place] [address] since your last visit? Include any pap smears or colon screening.  No     Chief Complaint   Patient presents with   [facility] Annual Wellness Visit       Visit Vitals  BP (!) 168/102 (BP 1 Location: Left upper arm, BP Patient Position: Sitting, BP Cuff Size: Adult)   Pulse 72   Temp 98.3 °F (36.8 °C) (Oral)   Resp 20   Ht 5' 5\" (1.651 m)   Wt 216 lb (98 kg)   SpO2 98%   BMI 35.94 kg/m²

## 2022-02-15 NOTE — TELEPHONE ENCOUNTER
Patient called stated you sent her Rx to wrong pharmacy please send to [address] [facility] (HYZAAR) 100-12.5 mg per tablet    Also needs VENTOLIN HFA, PROAIR HFA) 90 mcg/actuation inhaler

## 2022-02-16 LAB
ALBUMIN SERPL-MCNC: 4 G/DL (ref 3.8–4.8)
ALBUMIN/GLOB SERPL: 1.5 {RATIO} (ref 1.2–2.2)
ALP SERPL-CCNC: 83 IU/L (ref 44–121)
ALT SERPL-CCNC: 9 IU/L (ref 0–32)
AST SERPL-CCNC: 13 IU/L (ref 0–40)
BASOPHILS # BLD AUTO: 0.1 X10E3/UL (ref 0–0.2)
BASOPHILS NFR BLD AUTO: 1 %
BILIRUB SERPL-MCNC: 0.3 MG/DL (ref 0–1.2)
BUN SERPL-MCNC: 16 MG/DL (ref 8–27)
BUN/CREAT SERPL: 18 (ref 12–28)
CALCIUM SERPL-MCNC: 9.7 MG/DL (ref 8.7–10.3)
CHLORIDE SERPL-SCNC: 104 MMOL/L (ref 96–106)
CHOLEST SERPL-MCNC: 223 MG/DL (ref 100–199)
CO2 SERPL-SCNC: 16 MMOL/L (ref 20–29)
CREAT SERPL-MCNC: 0.91 MG/DL (ref 0.57–1)
EOSINOPHIL # BLD AUTO: 0.1 X10E3/UL (ref 0–0.4)
EOSINOPHIL NFR BLD AUTO: 1 %
ERYTHROCYTE [DISTWIDTH] IN BLOOD BY AUTOMATED COUNT: 12.6 % (ref 11.7–15.4)
GLOBULIN SER CALC-MCNC: 2.6 G/DL (ref 1.5–4.5)
GLUCOSE SERPL-MCNC: 71 MG/DL (ref 65–99)
HCT VFR BLD AUTO: 51.4 % (ref 34–46.6)
HDLC SERPL-MCNC: 28 MG/DL
HGB BLD-MCNC: 17.6 G/DL (ref 11.1–15.9)
IMM GRANULOCYTES # BLD AUTO: 0.1 X10E3/UL (ref 0–0.1)
IMM GRANULOCYTES NFR BLD AUTO: 1 %
LDLC SERPL CALC-MCNC: 146 MG/DL (ref 0–99)
LYMPHOCYTES # BLD AUTO: 3.1 X10E3/UL (ref 0.7–3.1)
LYMPHOCYTES NFR BLD AUTO: 31 %
MCH RBC QN AUTO: 32.8 PG (ref 26.6–33)
MCHC RBC AUTO-ENTMCNC: 34.2 G/DL (ref 31.5–35.7)
MCV RBC AUTO: 96 FL (ref 79–97)
MONOCYTES # BLD AUTO: 0.5 X10E3/UL (ref 0.1–0.9)
MONOCYTES NFR BLD AUTO: 5 %
NEUTROPHILS # BLD AUTO: 6.3 X10E3/UL (ref 1.4–7)
NEUTROPHILS NFR BLD AUTO: 61 %
PLATELET # BLD AUTO: 256 X10E3/UL (ref 150–450)
POTASSIUM SERPL-SCNC: 4.4 MMOL/L (ref 3.5–5.2)
PROT SERPL-MCNC: 6.6 G/DL (ref 6–8.5)
RBC # BLD AUTO: 5.36 X10E6/UL (ref 3.77–5.28)
SODIUM SERPL-SCNC: 139 MMOL/L (ref 134–144)
T4 FREE SERPL-MCNC: 1.33 NG/DL (ref 0.82–1.77)
TRIGL SERPL-MCNC: 268 MG/DL (ref 0–149)
TSH SERPL DL<=0.005 MIU/L-ACNC: 11.4 UIU/ML (ref 0.45–4.5)
VLDLC SERPL CALC-MCNC: 49 MG/DL (ref 5–40)
WBC # BLD AUTO: 10.2 X10E3/UL (ref 3.4–10.8)

## 2022-02-22 RX ORDER — LOSARTAN POTASSIUM AND HYDROCHLOROTHIAZIDE 12.5; 1 MG/1; MG/1
1 TABLET ORAL DAILY
Qty: 90 TABLET | Refills: 1 | Status: SHIPPED | OUTPATIENT
Start: 2022-02-22 | End: 2022-08-21

## 2022-02-22 RX ORDER — ALBUTEROL SULFATE 90 UG/1
2 AEROSOL, METERED RESPIRATORY (INHALATION)
Qty: 18 G | Refills: 5 | Status: SHIPPED | OUTPATIENT
Start: 2022-02-22

## 2022-03-01 NOTE — PROGRESS NOTES
Notify the patient hemoglobin is slightly up at 17.6 he might has some hypoxia when she is sleeping with sleep apnea should be rechecked again also seen in smokers and relative polycythemia would recheck in another month lab work kidney function test glucose electrolytes liver enzymes all normal cholesterol borderline low-cholesterol low-fat diet exercise lose weight we can refer to hematologist if sleep apnea is not present that the patient agrees

## 2022-03-18 PROBLEM — H92.03 OTALGIA OF BOTH EARS: Status: ACTIVE | Noted: 2021-01-11

## 2022-03-18 PROBLEM — R49.0 DYSPHONIA: Status: ACTIVE | Noted: 2021-01-11

## 2022-03-18 PROBLEM — J42 CHRONIC BRONCHITIS WITH WHEEZING (HCC): Status: ACTIVE | Noted: 2020-12-14

## 2022-03-19 PROBLEM — F17.200 SMOKER: Status: ACTIVE | Noted: 2021-01-11

## 2022-03-19 PROBLEM — R43.0 ANOSMIA: Status: ACTIVE | Noted: 2021-01-11

## 2022-03-19 PROBLEM — J31.0 CHRONIC RHINITIS: Status: ACTIVE | Noted: 2021-01-11

## 2022-03-19 PROBLEM — J34.2 DNS (DEVIATED NASAL SEPTUM): Status: ACTIVE | Noted: 2021-01-11

## 2022-03-30 DIAGNOSIS — M54.50 CHRONIC BILATERAL LOW BACK PAIN WITHOUT SCIATICA: Primary | ICD-10-CM

## 2022-03-30 DIAGNOSIS — G89.29 CHRONIC BILATERAL LOW BACK PAIN WITHOUT SCIATICA: Primary | ICD-10-CM

## 2022-03-30 RX ORDER — OXYCODONE AND ACETAMINOPHEN 5; 325 MG/1; MG/1
1 TABLET ORAL
Qty: 45 TABLET | Refills: 0 | Status: SHIPPED | OUTPATIENT
Start: 2022-03-30 | End: 2022-04-13

## 2022-07-05 ENCOUNTER — TELEPHONE (OUTPATIENT)
Dept: INTERNAL MEDICINE CLINIC | Age: 62
End: 2022-07-05

## 2022-07-05 RX ORDER — LEVOTHYROXINE SODIUM 150 UG/1
TABLET ORAL
Qty: 30 TABLET | Refills: 0 | Status: CANCELLED | OUTPATIENT
Start: 2022-07-05

## 2022-07-05 NOTE — TELEPHONE ENCOUNTER
[address] faxed refill request for the following medication:      Levothyroxine 150 MCG Tablet  QTY: 30  Take 1 tablet by mouth once daily         LOV 2-

## 2022-07-09 RX ORDER — LEVOTHYROXINE SODIUM 150 UG/1
TABLET ORAL
Qty: 30 TABLET | Refills: 0 | OUTPATIENT
Start: 2022-07-09

## 2022-09-16 ENCOUNTER — TELEPHONE (OUTPATIENT)
Dept: OBGYN CLINIC | Age: 62
End: 2022-09-16

## 2022-09-16 NOTE — TELEPHONE ENCOUNTER
Returned the patient's call and she states she has been told we don't participate with her insurance. Apologized to the patient and she states she would like Dr Aaron Wells to know of what has occurred. Information forwarded to Dr Aaron Wells.

## 2023-05-18 RX ORDER — LEVOTHYROXINE SODIUM 0.15 MG/1
TABLET ORAL
COMMUNITY
Start: 2022-02-08

## 2023-05-18 RX ORDER — OMEPRAZOLE 40 MG/1
1 CAPSULE, DELAYED RELEASE ORAL DAILY
COMMUNITY
Start: 2020-10-03

## 2023-05-18 RX ORDER — CITALOPRAM 20 MG/1
20 TABLET ORAL DAILY
COMMUNITY

## 2023-05-18 RX ORDER — DEXAMETHASONE 4 MG/1
TABLET ORAL
COMMUNITY
Start: 2020-11-17

## 2023-05-18 RX ORDER — ALBUTEROL SULFATE 90 UG/1
2 AEROSOL, METERED RESPIRATORY (INHALATION) EVERY 6 HOURS PRN
COMMUNITY
Start: 2022-02-15

## 2024-01-30 ENCOUNTER — TELEPHONE (OUTPATIENT)
Age: 64
End: 2024-01-30

## 2024-01-30 NOTE — TELEPHONE ENCOUNTER
1/30/2024 @1:06pm-Called [phone], no answer and unable to leave a message due to no VM picked up. This call is regarding a message I received from Dr. Marquez to schedule an appt for the patient.ww

## 2024-06-13 NOTE — Clinical Note
[address] [address] [place] [address] 
[phone] Work/School Note Date: 11/2/2020 To Whom It May concern: 
 
Hoda Gan was evaulated by the following provider(s): 
Attending Provider: Bang Isaac [address] [place] [address] virus is suspected. Per the CDC guidelines we recommend home isolation until the following conditions are all met: 1. At least 10 days have passed since symptoms first appeared and 2. At least 24 hours have passed since last fever without the use of fever-reducing medications and 
3. Symptoms (e.g., cough, shortness of breath) have improved Sincerely, Inocencio Boast, MD Spoke with Patsy at [facility] to inform her that Dr. Diana did not prescribe Briviact. Dr. Simon did and it was sent to [facility].

## 2025-04-18 ENCOUNTER — OFFICE VISIT (OUTPATIENT)
Age: 65
End: 2025-04-18

## 2025-04-18 VITALS
HEIGHT: 64 IN | DIASTOLIC BLOOD PRESSURE: 77 MMHG | BODY MASS INDEX: 38.61 KG/M2 | SYSTOLIC BLOOD PRESSURE: 128 MMHG | WEIGHT: 226.13 LBS

## 2025-04-18 DIAGNOSIS — N95.2 VAGINAL ATROPHY: ICD-10-CM

## 2025-04-18 DIAGNOSIS — N90.89 VULVAR IRRITATION: ICD-10-CM

## 2025-04-18 DIAGNOSIS — Z01.419 GYNECOLOGIC EXAM NORMAL: ICD-10-CM

## 2025-04-18 DIAGNOSIS — N95.1 MENOPAUSAL SYNDROME: ICD-10-CM

## 2025-04-18 DIAGNOSIS — Z12.4 PAP SMEAR FOR CERVICAL CANCER SCREENING: ICD-10-CM

## 2025-04-18 DIAGNOSIS — Z12.31 SCREENING MAMMOGRAM FOR BREAST CANCER: Primary | ICD-10-CM

## 2025-04-18 RX ORDER — LEVOTHYROXINE SODIUM 175 UG/1
TABLET ORAL
COMMUNITY
Start: 2025-04-16

## 2025-04-18 RX ORDER — ATORVASTATIN CALCIUM 20 MG/1
TABLET, FILM COATED ORAL
COMMUNITY
Start: 2025-04-16

## 2025-04-18 RX ORDER — ESTRADIOL 0.1 MG/G
CREAM VAGINAL
Qty: 42.5 G | Refills: 5 | Status: SHIPPED | OUTPATIENT
Start: 2025-04-18

## 2025-04-18 RX ORDER — HYDROCHLOROTHIAZIDE 25 MG/1
TABLET ORAL
COMMUNITY
Start: 2025-04-16

## 2025-04-18 ASSESSMENT — ENCOUNTER SYMPTOMS
GASTROINTESTINAL NEGATIVE: 1
RESPIRATORY NEGATIVE: 1

## 2025-04-18 NOTE — PROGRESS NOTES
Chief Complaint   Patient presents with    New Patient     Annual exam  Mammo-2025     /77 (BP Site: Left Upper Arm, Patient Position: Sitting, BP Cuff Size: Large Adult)   Ht 1.626 m (5' 4\")   Wt 102.6 kg (226 lb 2 oz)   BMI 38.81 kg/m²

## 2025-04-18 NOTE — PROGRESS NOTES
Alem Au is a No obstetric history on file., 64 y.o. female   No LMP recorded. (Menstrual status: Menopause).    She presents for her annual    She is having worsening vulva irritation, not on HRT      Menstrual status:  Cycles are menopausal.    Flow: absent.      She does not have dysmenorrhea.      Medical conditions:  Since her last annual GYN exam about one year ago, she has not the following changes in her health history: none.     Mammogram History:    DANNI Results (most recent):  @[facility]([id number]:1)@     DEXA Results (most recent):  @[facility]([id number]:1)@       Past Medical History:   Diagnosis Date    Esophageal abnormality     GERD (gastroesophageal reflux disease)     Hypertension     Obesity     Postmenopausal     Thyroid disease      Past Surgical History:   Procedure Laterality Date    APPENDECTOMY       SECTION      times 2    COLONOSCOPY      HEENT      Throat surgery    LASER ABLATION N/A     /esophagus    UPPER GASTROINTESTINAL ENDOSCOPY         Prior to Admission medications    Medication Sig Start Date End Date Taking? Authorizing Provider   atorvastatin (LIPITOR) 20 MG tablet  25  Yes Provider, MD Gray   levothyroxine (SYNTHROID) 175 MCG tablet  25  Yes Provider, MD Gray   hydroCHLOROthiazide (HYDRODIURIL) 25 MG tablet  25  Yes Provider, MD Gray   estradiol (ESTRACE VAGINAL) 0.1 MG/GM vaginal cream Apply pea size amount to vulva twice a week at night 25  Yes Talon Marquez MD   citalopram (CELEXA) 20 MG tablet Take 1 tablet by mouth daily   Yes Automatic Reconciliation, Ar   omeprazole (PRILOSEC) 40 MG delayed release capsule Take 1 capsule by mouth daily 10/3/20  Yes Automatic Reconciliation, Ar       No Known Allergies       Tobacco History:  reports that she quit smoking about 10 years ago. Her smoking use included cigarettes. She has never used smokeless tobacco.  Alcohol use:  has no history on file for alcohol  Shamir Barrett is a 26 y.o. female, G3 [id number] [id number], Patient's last menstrual period was 02/22/2023., who presents today for the following:  Chief Complaint   Patient presents with    Annual Exam        No Known Allergies    No current outpatient medications on file. No current facility-administered medications for this visit. History reviewed. No pertinent past medical history. History reviewed. No pertinent surgical history. History reviewed. No pertinent family history. Social History     Socioeconomic History    Marital status: SINGLE     Spouse name: Not on file    Number of children: Not on file    Years of education: Not on file    Highest education level: Not on file   Occupational History    Not on file   Tobacco Use    Smoking status: Never    Smokeless tobacco: Never   Substance and Sexual Activity    Alcohol use: Not Currently    Drug use: Never    Sexual activity: Yes     Partners: Male     Birth control/protection: None   Other Topics Concern    Not on file   Social History Narrative    Not on file     Social Determinants of Health     Financial Resource Strain: Not on file   Food Insecurity: Not on file   Transportation Needs: Not on file   Physical Activity: Not on file   Stress: Not on file   Social Connections: Not on file   Intimate Partner Violence: Not on file   Housing Stability: Not on file         Annual Exam  Annual     Review of Systems   Constitutional: Negative. Respiratory: Negative. Cardiovascular: Negative. Gastrointestinal: Negative. Genitourinary: Negative. Musculoskeletal: Negative. Skin: Negative. Neurological: Negative. Endo/Heme/Allergies: Negative. Psychiatric/Behavioral: Negative. All other systems reviewed and are negative.        BP (!) 150/86 (BP 1 Location: Right upper arm, BP Patient Position: Sitting)   Pulse 84   Resp 16   Ht 5' (1.524 m)   Wt 229 lb (103.9 kg)   LMP 02/22/2023   SpO2 97%   BMI 44.72 kg/m² OBGyn Exam   Constitutional:   General Appearance: healthy-appearing, well-nourished, and well-developed; Level of Distress: NAD. Ambulation: ambulating normally. Psychiatric:   Insight: good judgement. Mental Status: normal mood and affect and active and alert. Orientation: to time, place, and person. Memory: recent memory normal and remote memory normal.     Head: Head: normocephalic and atraumatic. Neck:   Neck: supple, FROM, trachea midline, and no masses. Lymph Nodes: no cervical LAD, supraclavicular LAD, axillary LAD, or inguinal LAD. Lungs:   Respiratory effort: no dyspnea. Cardiovascular:   Pulses including femoral / pedal: normal throughout. Breast: Breast: no masses or abnormal secretions and normal appearance. Abdomen: : no tenderness, guarding, masses, rebound tenderness, or CVA tenderness and non-distended. Female :   External genitalia: no lesions or rash and normal.   Vagina: moist mucosa. Cervix: no discharge, inflammation, or cervical motion tenderness   Uterus: midline, smooth, and non-tender; normal size   Adnexae: no adnexal mass or tenderness and size WNL. Bladder and Urethra: normal bladder and urethra (except where noted). .     1. Gynecologic exam normal    - PAP IG, CT-NG-TV, RFX APTIMA HPV ASCUS ([id number])    2. Encounter for screening for malignant neoplasm of cervix      3.  Routine screening for STI (sexually transmitted infection)    - PAP IG, CT-NG-TV, RFX APTIMA HPV ASCUS ([id number])        Follow-up and Dispositions    Return in about 1 year (around 3/9/2024) for annual.

## 2025-05-01 ENCOUNTER — RESULTS FOLLOW-UP (OUTPATIENT)
Age: 65
End: 2025-05-01

## 2025-05-01 LAB
., LABCORP: NORMAL
CYTOLOGIST CVX/VAG CYTO: NORMAL
CYTOLOGY CVX/VAG DOC CYTO: NORMAL
CYTOLOGY CVX/VAG DOC THIN PREP: NORMAL
DX ICD CODE: NORMAL
OTHER STN SPEC: NORMAL
PATHOLOGIST CVX/VAG CYTO: NORMAL
SERVICE CMNT-IMP: NORMAL
STAT OF ADQ CVX/VAG CYTO-IMP: NORMAL

## 2025-05-21 ENCOUNTER — PROCEDURE VISIT (OUTPATIENT)
Age: 65
End: 2025-05-21

## 2025-05-21 VITALS — WEIGHT: 226.13 LBS | BODY MASS INDEX: 38.61 KG/M2 | HEIGHT: 64 IN

## 2025-05-21 DIAGNOSIS — N90.89 VULVAL LESION: Primary | ICD-10-CM

## 2025-05-21 ASSESSMENT — ENCOUNTER SYMPTOMS
GASTROINTESTINAL NEGATIVE: 1
RESPIRATORY NEGATIVE: 1

## 2025-05-21 NOTE — PROGRESS NOTES
Chief Complaint   Patient presents with    Other     Vulvar biopsy     Ht 1.626 m (5' 4\")   Wt 102.6 kg (226 lb 2 oz)   BMI 38.81 kg/m²

## 2025-05-21 NOTE — PROGRESS NOTES
Alem Au is a No obstetric history on file., 64 y.o. female   No LMP recorded. (Menstrual status: Menopause).    She presents for her problem    She is having  skin changes noticed on previous exam .      Menstrual status:  Cycles are menopausal.    Flow: absent.      She does not have dysmenorrhea.      Medical conditions:  Since her last annual GYN exam about one year ago, she has not the following changes in her health history: none.     Mammogram History:    [facility] (most recent[facility]  [id number]@     DEXA Results (most recent):  [facility]([id number]:1[facility]@       Past Medical History:   Diagnosis Date    Esophageal abnormality     GERD (gastroesophageal reflux disease)     Hypertension     Obesity     Postmenopausal     Thyroid disease      Past Surgical History:   Procedure Laterality Date    APPENDECTOMY       SECTION      times 2    COLONOSCOPY      HEENT      Throat surgery    LASER ABLATION N/A     /esophagus    UPPER GASTROINTESTINAL ENDOSCOPY      VULVAR/PERINEAL BIOPSY N/A 2025       Prior to Admission medications    Medication Sig Start Date End Date Taking? Authorizing Provider   atorvastatin (LIPITOR) 20 MG tablet  25  Yes Provider, MD Gray   levothyroxine (SYNTHROID) 175 MCG tablet  25  Yes Provider, MD Gray   hydroCHLOROthiazide (HYDRODIURIL) 25 MG tablet  25  Yes Provider, MD Gray   estradiol (ESTRACE VAGINAL) 0.1 MG/GM vaginal cream Apply pea size amount to vulva twice a week at night 25  Yes Talon Marquez MD   citalopram (CELEXA) 20 MG tablet Take 1 tablet by mouth daily   Yes Automatic Reconciliation, Ar   omeprazole (PRILOSEC) 40 MG delayed release capsule Take 1 capsule by mouth in the morning and at bedtime 10/3/20  Yes Automatic Reconciliation, Ar       No Known Allergies       Tobacco History:  reports that she quit smoking about 10 years ago. Her smoking use included cigarettes. She has never used

## 2025-05-28 LAB
CPT BILLING CODE: NORMAL
DIAGNOSIS SYNOPSIS:: NORMAL
DX ICD CODE: NORMAL
PATH REPORT.COMMENTS IMP SPEC: NORMAL
PATH REPORT.FINAL DX SPEC: NORMAL
PATH REPORT.GROSS SPEC: NORMAL
PATH REPORT.MICROSCOPIC SPEC OTHER STN: NORMAL
PATH REPORT.SITE OF ORIGIN SPEC: NORMAL
PATHOLOGIST NAME: NORMAL
PAYMENT PROCEDURE: NORMAL

## 2025-05-29 ENCOUNTER — RESULTS FOLLOW-UP (OUTPATIENT)
Age: 65
End: 2025-05-29